# Patient Record
Sex: MALE | Race: WHITE | NOT HISPANIC OR LATINO | Employment: FULL TIME | URBAN - NONMETROPOLITAN AREA
[De-identification: names, ages, dates, MRNs, and addresses within clinical notes are randomized per-mention and may not be internally consistent; named-entity substitution may affect disease eponyms.]

---

## 2017-01-10 ENCOUNTER — COMMUNICATION - GICH (OUTPATIENT)
Dept: FAMILY MEDICINE | Facility: OTHER | Age: 22
End: 2017-01-10

## 2017-01-10 DIAGNOSIS — J45.20 MILD INTERMITTENT ASTHMA, UNCOMPLICATED: ICD-10-CM

## 2017-01-26 ENCOUNTER — COMMUNICATION - GICH (OUTPATIENT)
Dept: FAMILY MEDICINE | Facility: OTHER | Age: 22
End: 2017-01-26

## 2017-01-26 DIAGNOSIS — J45.20 MILD INTERMITTENT ASTHMA, UNCOMPLICATED: ICD-10-CM

## 2017-04-14 ENCOUNTER — OFFICE VISIT - GICH (OUTPATIENT)
Dept: FAMILY MEDICINE | Facility: OTHER | Age: 22
End: 2017-04-14

## 2017-04-14 ENCOUNTER — HISTORY (OUTPATIENT)
Dept: FAMILY MEDICINE | Facility: OTHER | Age: 22
End: 2017-04-14

## 2017-04-14 DIAGNOSIS — J45.909 UNCOMPLICATED ASTHMA: ICD-10-CM

## 2017-04-14 DIAGNOSIS — B97.89 OTHER VIRAL AGENTS AS THE CAUSE OF DISEASES CLASSIFIED ELSEWHERE: ICD-10-CM

## 2017-04-14 DIAGNOSIS — J06.9 ACUTE UPPER RESPIRATORY INFECTION: ICD-10-CM

## 2017-05-24 ENCOUNTER — HISTORY (OUTPATIENT)
Dept: FAMILY MEDICINE | Facility: OTHER | Age: 22
End: 2017-05-24

## 2017-05-24 ENCOUNTER — OFFICE VISIT - GICH (OUTPATIENT)
Dept: FAMILY MEDICINE | Facility: OTHER | Age: 22
End: 2017-05-24

## 2017-05-24 DIAGNOSIS — R62.7 ADULT FAILURE TO THRIVE: ICD-10-CM

## 2017-05-24 DIAGNOSIS — J01.00 ACUTE MAXILLARY SINUSITIS: ICD-10-CM

## 2017-05-24 DIAGNOSIS — J34.89 OTHER SPECIFIED DISORDERS OF NOSE AND NASAL SINUSES: ICD-10-CM

## 2017-08-19 ENCOUNTER — COMMUNICATION - GICH (OUTPATIENT)
Dept: FAMILY MEDICINE | Facility: OTHER | Age: 22
End: 2017-08-19

## 2017-08-19 DIAGNOSIS — J45.20 MILD INTERMITTENT ASTHMA, UNCOMPLICATED: ICD-10-CM

## 2017-12-24 ENCOUNTER — HISTORY (OUTPATIENT)
Dept: EMERGENCY MEDICINE | Facility: OTHER | Age: 22
End: 2017-12-24

## 2017-12-27 ENCOUNTER — COMMUNICATION - GICH (OUTPATIENT)
Dept: FAMILY MEDICINE | Facility: OTHER | Age: 22
End: 2017-12-27

## 2017-12-27 DIAGNOSIS — J45.20 MILD INTERMITTENT ASTHMA, UNCOMPLICATED: ICD-10-CM

## 2017-12-28 NOTE — TELEPHONE ENCOUNTER
Patient Information     Patient Name MRN Sex Felix Boyle [id number] Male 1995      Telephone Encounter by Juan Zapata RN at 2017 12:50 PM     Author:  Juan Zapata RN Service:  (none) Author Type:  NURS- Registered Nurse     Filed:  2017 12:59 PM Encounter Date:  2017 Status:  Signed     :  Juan Zapata RN (NURS- Registered Nurse)            Oral Steroid Inhalers    Office visit in the past 12 months.    Last visit with [facility] was on: 2016 in [facility]  Next visit with KAITY CAR is on: No future appointment listed with this provider  Next visit with Family Practice is on: No future appointment listed in this department    Max refills 12 months from last office visit.    Chart review shows that patient remains overdue for an annual physical with PCP. Last refill of pulmicort on 1/10/17, as well as office visit on 17 with Dr. Bauer indicates this as well. No appointments noted since 1/10/17 and 17. No appointment scheduled at this time. Call placed to patient to discuss. Patient willing to be seen by PCP for a physical/annaul medication management appointment. Writer advised patient that he could fill rx as requested through appointment date once office visit with scheduled. Patient agrees with plan of care. Was transferred to scheduling staff for an appointment. Appointment noted for 17. Writer will fill rx as requested through patient's appointment date as noted.    Prescription refilled per RN Medication Refill Policy.................... Juan Zapata RN ....................  2017   12:58 PM

## 2018-01-02 NOTE — TELEPHONE ENCOUNTER
Patient Information     Patient Name MRN Sex Felix Boyle [id number] Male 1995      Telephone Encounter by Gabriela Ghosh RN at 1/10/2017  2:11 PM     Author:  Gabriela Ghosh RN Service:  (none) Author Type:  NURS- Registered Nurse     Filed:  1/10/2017  2:13 PM Encounter Date:  1/10/2017 Status:  Signed     :  Gabriela Ghosh RN (NURS- Registered Nurse)            Oral Steroid Inhalers    Office visit in the past 12 months.    Last visit with [facility] was on: 2016 [facility]  Next visit with [facility] CAR is on: No future appointment listed with this provider  Next visit with Family Practice is on: No future appointment listed in this department    Max refills 12 months from last office visit.    Due for medication management appointment. Refill given and my chart message sent. Medication has not been addressed in over a year Prescription refilled per RN Medication Refill Policy.................... GABRIELA GHOSH RN ....................  1/10/2017   2:12 PM

## 2018-01-03 NOTE — TELEPHONE ENCOUNTER
Patient Information     Patient Name MRN Sex Felix Boyle [id number] Male 1995      Telephone Encounter by Juan Zapata RN at 2017  8:07 AM     Author:  Juan Zapata RN Service:  (none) Author Type:  NURS- Registered Nurse     Filed:  2017  8:16 AM Encounter Date:  2017 Status:  Signed     :  Juan Zapata RN (NURS- Registered Nurse)            This is a Refill request from: [facility]  Name of Medication: Ventolin  Quantity requested: 3 inhalers with 3 refills  Last fill date: 2016  Due for refill: Unknown, see below  Last visit with [facility] CAR was on: 2016 in [facility]  PCP:  Kaity Car MD  Controlled Substance Agreement:  N/A   Diagnosis r/t this medication request: Mild intermittent asthma    Per chart review, ventolin was discontinued in patient's chart on 16 as patient stated he was no longer taking medication. Medication is not currently on patient's medication list. However, last office with Dr. Nova on 2016 states that patient can utilize his rescue inhaler if needed if he feels wheezy. Will pend refill request to PCP for his consideration and approval.     Unable to complete prescription refill per RN Medication Refill Policy.................... Juan Zapata RN ....................  2017   8:10 AM

## 2018-01-04 NOTE — NURSING NOTE
Patient Information     Patient Name MRN Sex Felix Boyle [id number] Male 1995      Nursing Note by Sofía Momin at 2017  4:15 PM     Author:  Sofía Momin Service:  (none) Author Type:  (none)     Filed:  2017  5:10 PM Encounter Date:  2017 Status:  Signed     :  Sofía Momin            Patient presents to the clinic for nasal congestion, sinus pressure, headaches, productive cough with clear and sometimes green phlegm. Patient states it has been going on for about a week. He has tried Claritin and has an inhaler and nebulizer for asthma which seems to help loosen everything up a little.   Sofía MOON, CMA 2017

## 2018-01-04 NOTE — PROGRESS NOTES
Patient Information     Patient Name MRN Sex     Felix Gutierrez [id number] Male 1995      Progress Notes by Brianne Clarke NP at 2017  4:15 PM     Author:  Brianne Clarke NP Service:  (none) Author Type:  PHYS- Nurse Practitioner     Filed:  2017  5:16 PM Encounter Date:  2017 Status:  Signed     :  Brianne Clarke NP (PHYS- Nurse Practitioner)            Nursing Notes:   Sofía Momin  2017  5:10 PM  Signed  Patient presents to the clinic for nasal congestion, sinus pressure, headaches, productive cough with clear and sometimes green phlegm. Patient states it has been going on for about a week. He has tried Claritin and has an inhaler and nebulizer for asthma which seems to help loosen everything up a little.   Sofía MOON CMA 2017    SUBJECTIVE:    Felix Gutierrez is a 22 y.o. male who presents for Congestion and cough    URI    This is a new problem. The current episode started in the past 7 days. The problem has been unchanged. There has been no fever. Associated symptoms include congestion, coughing, a plugged ear sensation, rhinorrhea and wheezing. Pertinent negatives include no chest pain, ear pain, sinus pain, sneezing, sore throat, swollen glands or vomiting. Associated symptoms comments: Has a HX of asthma. Is not great about using every day med. . He has tried antihistamine for the symptoms. The treatment provided mild relief.       Current Outpatient Prescriptions on File Prior to Visit       Medication  Sig Dispense Refill     albuterol (PROVENTIL) 0.083 % neb solution Inhale 3 mL via a nebulizer every 4 hours if needed for Shortness Of Breath. 1 box 12     albuterol-ipratropium (DUONEB) (2.5-0.5 mg) in 3 mL NEBULIZATION solution Inhale 3 mL via a nebulizer every 6 hours if needed. 1 box 0     Nebulizer Nebulizer, neb kit, neb cup and mask.   For home use. Length of need  for Medicare patients: lifelong 1 Device 0     PULMICORT FLEXHALER 90  "mcg/actuation inhaler INHALE ONE PUFF TWICE DAILY 1 Inhaler 0     VENTOLIN HFA 90 mcg/actuation inhaler INHALE TWO PUFFS INTO LUNGS EVERY 4 HOURS AS NEEDED FOR SHORTNESS OF BREATH OR  WHEEZING 3 Inhaler 3     No current facility-administered medications on file prior to visit.        REVIEW OF SYSTEMS:  Review of Systems   HENT: Positive for congestion and rhinorrhea. Negative for ear pain, sneezing and sore throat.    Respiratory: Positive for cough and wheezing.    Cardiovascular: Negative for chest pain.   Gastrointestinal: Negative for vomiting.       OBJECTIVE:  /80  Pulse 84  Temp 98.1  F (36.7  C) (Tympanic)  Ht 1.734 m (5' 8.25\")  Wt 56.7 kg (125 lb)  BMI 18.87 kg/m2    EXAM:   Physical Exam   Constitutional: He is well-developed, well-nourished, and in no distress.   HENT:   Head: Normocephalic and atraumatic.   Right Ear: Tympanic membrane and ear canal normal.   Left Ear: Tympanic membrane and ear canal normal.   Nose: Rhinorrhea present. Right sinus exhibits no maxillary sinus tenderness and no frontal sinus tenderness. Left sinus exhibits no maxillary sinus tenderness and no frontal sinus tenderness.   Mouth/Throat: Uvula is midline, oropharynx is clear and moist and mucous membranes are normal.   Eyes: Conjunctivae are normal.   Neck: Neck supple.   Cardiovascular: Normal rate, regular rhythm and normal heart sounds.    Pulmonary/Chest: Effort normal. No respiratory distress. He has no decreased breath sounds. He has wheezes in the right upper field and the left upper field. He has no rhonchi. He has no rales.   MIld upper airway wheezing.    Lymphadenopathy:     He has no cervical adenopathy.   Nursing note and vitals reviewed.      ASSESSMENT/PLAN:    ICD-10-CM    1. Viral URI with cough J06.9 predniSONE (DELTASONE) 20 mg tablet     B97.89    2. Uncomplicated asthma, unspecified asthma severity J45.909 predniSONE (DELTASONE) 20 mg tablet        Plan:  Prednisone discussed. Viral illness " discussed. Continue inhalers as prescribed. I explained my diagnostic considerations and recommendations to the patient, who voiced understanding and agreement with the treatment plan. All questions were answered. We discussed potential side effects of any prescribed or recommended therapies, as well as expectations for response to treatments. He was advised to contact our office if there is no improvement or worsening of conditions or symptoms.  If s/s worsen or persist, patient will either come back or follow up with PCP.       FREDDY LE NP ....................  4/14/2017   5:16 PM

## 2018-01-04 NOTE — PATIENT INSTRUCTIONS
Patient Information     Patient Name MRN Felix Lenz [id number] Male 1995      Patient Instructions by Brianne Clarke NP at 2017  4:15 PM     Author:  Brianne Clarke NP Service:  (none) Author Type:  PHYS- Nurse Practitioner     Filed:  2017  5:11 PM Encounter Date:  2017 Status:  Signed     :  Brianne Clarke NP (PHYS- Nurse Practitioner)            Cold Medicines   What are cold medicines?  Symptoms of the common cold start gradually over several days and usually last about two weeks. Symptoms may include sneezing, a stuffy or runny nose, sore throat, cough, watery eyes, mild headache, or body aches. A cold will go away on its own without treatment. However, there are many nonprescription products that may help relieve some of the symptoms of a cold. Cold medicines often contain more than one ingredient and are used to treat more than one symptom. Read the labels and buy products that have only the ingredients that you need. If you are not sure which medicine is best, ask your pharmacist.  How do they work?  Decongestants reduce swelling in your nose and sinuses. They may also lessen the amount of mucus made by your nose. If you use decongestants more often than directed, your stuffy nose may get worse.   Antihistamines block the effect of histamine. Histamine is a chemical your body makes when you have an allergic reaction. Antihistamines are most often used to treat itchy or watery eyes or a stuffy or runny nose caused by an allergy. Antihistamines may not help a stuffy or runny nose caused by a cold because they can make mucus thick and dry.  Mucolytics are medicines that make mucus thinner so that it is easier to cough up out of your throat and lungs.  Expectorants are cough medicines that may help to keep the mucus thin and bring up mucus from the lungs when you cough. This may relieve chest congestion and make it easier to breathe.   Cough suppressants  (antitussives) are medicines that lessen the urge to cough. They may give relief from a dry, hacking cough. If you have a cough that is wet sounding and produces mucus, it is important for you to cough the mucus up out of your lungs. For this reason, cough suppressants are not recommended for a wet sounding cough.  Fever and pain relievers, such as acetaminophen, aspirin, or other nonsteroidal anti-inflammatory drugs (NSAIDs), are often included in cold medicine. Read labels carefully to avoid taking more medicine than you need.  What else do I need to know about this medicine?    Talk to your healthcare provider if your symptoms start suddenly or you have severe symptoms. This may mean you have something more serious than a cold.    Follow the directions that come with your medicine, including information about food or alcohol. Make sure you know how and when to take your medicine. Do not take more or less than you are supposed to take.    Try to get all of your medicine at the same place. Your pharmacist can help make sure that all of your medicines are safe to take together.    Keep a list of your medicines with you. List all of the prescription medicines, nonprescription medicines, supplements, natural remedies, and vitamins that you take. Tell all healthcare providers who treat you about all of the products you are taking.    Many medicines have side effects. A side effect is a symptom or problem that is caused by the medicine. Ask your healthcare provider or pharmacist what side effects the medicine may cause and what you should do if you have side effects.    Nonsteroidal anti-inflammatory medicines (NSAIDs), such as ibuprofen, naproxen, and aspirin, may cause stomach bleeding and other problems. These risks increase with age. Read the label and take as directed. Unless recommended by your healthcare provider, do not take for more than 10 days for any reason.    Acetaminophen may cause liver damage or other  problems. Unless recommended by your provider, don't take more than 3000 milligrams (mg) in 24 hours. To make sure you don t take too much, check other medicines you take to see if they also contain acetaminophen. Ask your provider if you need to avoid drinking alcohol while taking this medicine.  If you have any questions, ask your healthcare provider or pharmacist for more information. Be sure to keep all appointments for provider visits or tests.

## 2018-01-05 NOTE — PATIENT INSTRUCTIONS
Patient Information     Patient Name MRN Sex Felix Boyle [id number] Male 1995      Patient Instructions by Rita Bauer MD at 2017 12:07 PM     Author:  Rita Bauer MD  Service:  (none) Author Type:  Physician     Filed:  2017 12:08 PM  Encounter Date:  2017 Status:  Addendum     :  Rita Bauer MD (Physician)        Related Notes: Original Note by Rita Bauer MD (Physician) filed at 2017 12:07 PM            Sinusitis (sinus infection)  Stay hydrated remember bladder irritants     Bladder irritants include callum, limes, caffeine, carbonated beverages and alcohol.       Follow up with Dr. Crawford;  Labs prior     Saline nasal spray  bactroban for nasal lesions   What you should do:    If you were prescribed antibiotics, take them until they are gone. Do not skip a dose.    Wash your hands often with soap and water    Get plenty of rest and fluids    Apply warm compresses to your face    Use sinus rinses, a humidifier or a vaporizer to add more moisture to your sinus tissues.    Think about using a Neti pot    Take acetaminophen (Tylenol ) or ibuprofen (Advil ) for pain    How will you know this plan is not working - warning signs you should watch for:    Pain or swelling around your eyes    Swollen, painful forehead and/or facial (sinus) pain    When should you be seen again?    If you develop severe headache, double vision, stiff neck or confusion, return right away.    If you have any of the warning signs listed above, return in 1 to 2 days.    If your symptoms do not get better in 7 to 10 days, return at that time.    Who should you see if the plan is not working?    Make an appointment to see me or return to your clinic    For more information about sinusitis, go to:  [email or web address]      Index   Caffeine in the Diet    ________________________________________________________________________  KEY POINTS    Caffeine affects many parts of the body. It can help you feel more alert and less tired.    Too much caffeine can cause jumpiness, trouble sleeping, and stomach problems. It can also affect your heartbeat.    The best way to see if you are getting too much caffeine is to notice how it affects you.  ________________________________________________________________________  What is caffeine?   Caffeine is a stimulant, which means it speeds up some of your body processes. It is found naturally in tea leaves, coffee beans, and cocoa beans. Caffeine can also be manmade.   Most caffeine in the diet comes from drinks such as coffee, tea, soda, or energy drinks. Caffeine can also be found in mints, gum, chocolates, energy bars, and other foods. Some vitamins, pain medicines, and diet pills also contain caffeine.  The FDA has warned people to avoid powdered pure caffeine. A teaspoon of this product is the same as 25 cups of coffee. Taking this amount of caffeine all at once can cause death.   How does caffeine affect the body?   Caffeine affects many parts of the body:    It stimulates the brain and makes you feel wide awake, energetic, and better able to concentrate.    It makes your heart beat faster.    It helps you go longer without feeling tired.    It makes you less sensitive to pain.    It helps muscles to work better.    It increases the amount of acid made by the stomach.  Too much caffeine can cause:    Restlessness, jumpiness, anxiety, and trouble sleeping    A fast or irregular heartbeat    Twitching muscles    Nausea    Stomach pain or heartburn  In the past it has been thought that caffeine can cause your body to lose too much fluid (dehydration). Coffee and tea work the same way that other fluids do. If you rarely drink caffeinated drinks and suddenly drink a lot of them, you may have to urinate more than if you'd had just  water. But the body quickly gets used to caffeine. If you regularly drink caffeine, you won't urinate any more after drinking caffeine than when you drink the same amount of water.   You can develop caffeine tolerance. This means you need more and more caffeine to get the desired effect. If you become dependent on caffeine, you have short-term withdrawal symptoms when you go without it. You may feel tired, have a headache, or be irritable.  Should I limit or avoid caffeine?  Caffeine may cause problems if you have:    Type 2 diabetes (More than 250 milligrams of caffeine can increase blood glucose levels.)    Anxiety or panic attacks    An irregular or pounding heartbeat    Heart disease, or if you had a heart attack in the past few weeks    Uncontrolled high blood pressure    Trouble sleeping    A peptic ulcer    Acid reflux    Premenstrual syndrome (PMS)  How much caffeine is too much?   A reasonable amount of caffeine is 200 to 300 mg per day. If you are pregnant, some healthcare providers recommend no caffeine during the first 3 months and no more than 200 mg a day during the rest of your pregnancy.    The amount of caffeine in a cup of coffee depends on the amount of coffee grounds used to make it and whether it was instant or brewed. An 8-ounce cup of regular coffee may contain 75 to 120 mg of caffeine.    Espresso coffee contains an average of 30 to 50 mg per 1-ounce cup.    An 8-ounce cup of tea may contain 20 to 100 mg of caffeine.    Soft drinks with caffeine have 30 to 70 mg caffeine in a 12-ounce serving.    Energy drinks range from 50 to 300 mg of caffeine in an 8-ounce serving. There may be 2 or 3 servings per can. They may also be high in calories.    Candy or products that say  energy  on the label may contain a lot of caffeine.  The best way to see if you are getting too much caffeine is to notice how it affects you. If you are having symptoms, then you are getting too much. If you have unpleasant  symptoms, cut back or switch to products that are caffeine free.  Developed by [employer].  Adult Advisor 2016.3 published by [employer].  Last modified: 2016-02-16  Last reviewed: 2015-03-02  This content is reviewed periodically and is subject to change as new health information becomes available. The information is intended to inform and educate and is not a replacement for medical evaluation, advice, diagnosis or treatment by a healthcare professional.  References   Adult Advisor 2016.3 Index    Copyright   2016 [employer], a division of [employer] All rights reserved.

## 2018-01-05 NOTE — NURSING NOTE
Patient Information     Patient Name MRN Sex Felix Boyle [id number] Male 1995      Nursing Note by Bernadette Amaral at 2017 11:45 AM     Author:  Bernadette Amaral Service:  (none) Author Type:  (none)     Filed:  2017 11:49 AM Encounter Date:  2017 Status:  Signed     :  Bernadette Amaral            Sinus Congestion X few weeks and has recently started to get some sores inside the nasal area that he is concerned about.   R) ear has been sore but is feeling better.   Bernadette Amaral LPN........................2017  11:45 AM

## 2018-01-05 NOTE — PROGRESS NOTES
Patient Information     Patient Name MRN Sex Felix Boyle [id number] Male 1995      Progress Notes by Rita Bauer MD at 2017 11:45 AM     Author:  Rita Bauer MD Service:  (none) Author Type:  Physician     Filed:  2017 12:27 PM Encounter Date:  2017 Status:  Signed     :  Rita Bauer MD (Physician)            Nursing Notes:   Bernadette Amaral  2017 11:49 AM  Signed  Sinus Congestion X few weeks and has recently started to get some sores inside the nasal area that he is concerned about.   R) ear has been sore but is feeling better.   Bernadette Amaral LPN........................2017  11:45 AM         Subjective:  Felix Gutierrez is a 22 y.o. male who presents for UPPER RESPIRATORY INFECTION      patient has a history of asthma and seasonal allergies. He started taking Claritin which has helped but he continues to have congestion in his face and cough. He notes it is been having sores in his nose last week. He is not using Flonase. Denies any illicit drug use. He denies picking at his nose. No significant epistaxis. He has had low-grade fevers. He has felt tired.    Inability to gain significant weight  patient notes that he has had inability to gain weight. He is noted to be tachycardiac today. He did have a cup of coffee. He has previously undergone workup for this in 2015 with echocardiogram which was normal. He is wondering if he has thyroid problem. He is sexually active. He is monogamous. No history of tattoos    Wt Readings from Last 3 Encounters:    17 55.8 kg (123 lb)   17 56.7 kg (125 lb)   16 54.4 kg (120 lb)           Allergies     Allergen  Reactions     Penicillins Rash     Current Outpatient Prescriptions on File Prior to Visit       Medication  Sig Dispense Refill     albuterol (PROVENTIL) 0.083 % neb solution Inhale 3 mL via a nebulizer every 4 hours if needed for Shortness Of Breath. 1 box 12      albuterol-ipratropium (DUONEB) (2.5-0.5 mg) in 3 mL NEBULIZATION solution Inhale 3 mL via a nebulizer every 6 hours if needed. 1 box 0     Nebulizer Nebulizer, neb kit, neb cup and mask.   For home use. Length of need  for Medicare patients: lifelong 1 Device 0     PULMICORT FLEXHALER 90 mcg/actuation inhaler INHALE ONE PUFF TWICE DAILY 1 Inhaler 0     VENTOLIN HFA 90 mcg/actuation inhaler INHALE TWO PUFFS INTO LUNGS EVERY 4 HOURS AS NEEDED FOR SHORTNESS OF BREATH OR  WHEEZING 3 Inhaler 3     No current facility-administered medications on file prior to visit.        Problem List/PMH: reviewed in EMR    Social Hx:  Social History        Substance Use Topics          Smoking status:   Former Smoker      Packs/day:  1.00      Types:  Cigarettes      Quit date:  1/31/2015      Smokeless tobacco:   Never Used      Alcohol use   0.0 oz/week     0 Standard drinks or equivalent per week        Comment: occasional       Social History Narrative    Parents are .  Dad [place], drives [employer].  Mom has a job here with [employer].  Patient is interested in music and [employer]. Works as  of the [employer] (2016)    Bel[place] Sister, age 13    Masha Gutierrez Mother                    Family Hx:   Family History       Problem   Relation Age of Onset     GI Disease  Mother      Crohns disease         Objective:  /60  Pulse 100  Temp 97.9  F (36.6  C) (Tympanic)   Wt 55.8 kg (123 lb)  BMI 18.57 kg/m2   patient is tired in appearance PERRLA EOMI small amount of conjunctivitis. TMs are retracted bilaterally small amount of fluid   Oropharynx with significant postnasal drip. Neck supple without adenopathy lungs clear heart sounds regular sinuses are tender to touch maxillary. No frontal. He has cystic acne on his T-zone. In his nares he has a small lesion that is not ulcerated in his left knee or at the 4 to 5 o'clock position and is a small 1-2 millimeter area of irritation in his  right knee or at the 11 o'clock position none of which are actively bleeding  Assessment:    ICD-10-CM    1. Nasal sore J34.89 mupirocin 2% topical (BACTROBAN OINTMENT) ointment      trimethoprim-sulfamethoxazole,  mg, (BACTRIM; SEPTRA) per tablet   2. Subacute maxillary sinusitis J01.00    3. Failure to gain weight in adult R62.7 COMPLETE METABOLIC PANEL      TSH      T4 (THYROXINE)      CBC WITH DIFFERENTIAL      HIV 1 & 2      HBSAG (HBS)      ANTI HCV      TREPONEMA PALLIDUM      GC CHLAMYDIA TRACH PROBE        He was reminded he has acute care appointment today but he was provided follow-up appointment with his primary care physician's labs ordered a head including STD screening    Plan:   -- Expected clinical course discussed   -- Medications and their side effects discussed  Patient Instructions   Sinusitis (sinus infection)  Stay hydrated remember bladder irritants     Bladder irritants include callum, limes, caffeine, carbonated beverages and alcohol.       Follow up with Dr. Crawford;  Labs prior     Saline nasal spray  bactroban for nasal lesions   What you should do:    If you were prescribed antibiotics, take them until they are gone. Do not skip a dose.    Wash your hands often with soap and water    Get plenty of rest and fluids    Apply warm compresses to your face    Use sinus rinses, a humidifier or a vaporizer to add more moisture to your sinus tissues.    Think about using a Neti pot    Take acetaminophen (Tylenol ) or ibuprofen (Advil ) for pain    How will you know this plan is not working - warning signs you should watch for:    Pain or swelling around your eyes    Swollen, painful forehead and/or facial (sinus) pain    When should you be seen again?    If you develop severe headache, double vision, stiff neck or confusion, return right away.    If you have any of the warning signs listed above, return in 1 to 2 days.    If your symptoms do not get better in 7 to 10 days, return at that  time.    Who should you see if the plan is not working?    Make an appointment to see me or return to your clinic    For more information about sinusitis, go to:  [email or web address]      Index   Caffeine in the Diet   ________________________________________________________________________  KEY POINTS    Caffeine affects many parts of the body. It can help you feel more alert and less tired.    Too much caffeine can cause jumpiness, trouble sleeping, and stomach problems. It can also affect your heartbeat.    The best way to see if you are getting too much caffeine is to notice how it affects you.  ________________________________________________________________________  What is caffeine?   Caffeine is a stimulant, which means it speeds up some of your body processes. It is found naturally in tea leaves, coffee beans, and cocoa beans. Caffeine can also be manmade.   Most caffeine in the diet comes from drinks such as coffee, tea, soda, or energy drinks. Caffeine can also be found in mints, gum, chocolates, energy bars, and other foods. Some vitamins, pain medicines, and diet pills also contain caffeine.  The FDA has warned people to avoid powdered pure caffeine. A teaspoon of this product is the same as 25 cups of coffee. Taking this amount of caffeine all at once can cause death.   How does caffeine affect the body?   Caffeine affects many parts of the body:    It stimulates the brain and makes you feel wide awake, energetic, and better able to concentrate.    It makes your heart beat faster.    It helps you go longer without feeling tired.    It makes you less sensitive to pain.    It helps muscles to work better.    It increases the amount of acid made by the stomach.  Too much caffeine can cause:    Restlessness, jumpiness, anxiety, and trouble sleeping    A fast or irregular heartbeat    Twitching muscles    Nausea    Stomach pain or heartburn  In the past it has been thought that  caffeine can cause your body to lose too much fluid (dehydration). Coffee and tea work the same way that other fluids do. If you rarely drink caffeinated drinks and suddenly drink a lot of them, you may have to urinate more than if you'd had just water. But the body quickly gets used to caffeine. If you regularly drink caffeine, you won't urinate any more after drinking caffeine than when you drink the same amount of water.   You can develop caffeine tolerance. This means you need more and more caffeine to get the desired effect. If you become dependent on caffeine, you have short-term withdrawal symptoms when you go without it. You may feel tired, have a headache, or be irritable.  Should I limit or avoid caffeine?  Caffeine may cause problems if you have:    Type 2 diabetes (More than 250 milligrams of caffeine can increase blood glucose levels.)    Anxiety or panic attacks    An irregular or pounding heartbeat    Heart disease, or if you had a heart attack in the past few weeks    Uncontrolled high blood pressure    Trouble sleeping    A peptic ulcer    Acid reflux    Premenstrual syndrome (PMS)  How much caffeine is too much?   A reasonable amount of caffeine is 200 to 300 mg per day. If you are pregnant, some healthcare providers recommend no caffeine during the first 3 months and no more than 200 mg a day during the rest of your pregnancy.    The amount of caffeine in a cup of coffee depends on the amount of coffee grounds used to make it and whether it was instant or brewed. An 8-ounce cup of regular coffee may contain 75 to 120 mg of caffeine.    Espresso coffee contains an average of 30 to 50 mg per 1-ounce cup.    An 8-ounce cup of tea may contain 20 to 100 mg of caffeine.    Soft drinks with caffeine have 30 to 70 mg caffeine in a 12-ounce serving.    Energy drinks range from 50 to 300 mg of caffeine in an 8-ounce serving. There may be 2 or 3 servings per can. They may also be high in calories.    Candy  or products that say  energy  on the label may contain a lot of caffeine.  The best way to see if you are getting too much caffeine is to notice how it affects you. If you are having symptoms, then you are getting too much. If you have unpleasant symptoms, cut back or switch to products that are caffeine free.  Developed by [employer].  Adult Advisor 2016.3 published by [employer].  Last modified: 2016-02-16  Last reviewed: 2015-03-02  This content is reviewed periodically and is subject to change as new health information becomes available. The information is intended to inform and educate and is not a replacement for medical evaluation, advice, diagnosis or treatment by a healthcare professional.  References   Adult Advisor 2016.3 Index    Copyright   2016 [employer], a division of [employer] All rights reserved.           Electronically signed by Rita Bauer MD

## 2018-01-11 ENCOUNTER — COMMUNICATION - GICH (OUTPATIENT)
Dept: FAMILY MEDICINE | Facility: OTHER | Age: 23
End: 2018-01-11

## 2018-01-26 VITALS
TEMPERATURE: 98.1 F | HEART RATE: 84 BPM | DIASTOLIC BLOOD PRESSURE: 80 MMHG | HEIGHT: 68 IN | BODY MASS INDEX: 18.94 KG/M2 | SYSTOLIC BLOOD PRESSURE: 120 MMHG | WEIGHT: 125 LBS

## 2018-01-26 VITALS
TEMPERATURE: 97.9 F | SYSTOLIC BLOOD PRESSURE: 110 MMHG | DIASTOLIC BLOOD PRESSURE: 60 MMHG | BODY MASS INDEX: 18.7 KG/M2 | WEIGHT: 123 LBS | HEART RATE: 100 BPM

## 2018-01-29 ENCOUNTER — DOCUMENTATION ONLY (OUTPATIENT)
Dept: FAMILY MEDICINE | Facility: OTHER | Age: 23
End: 2018-01-29

## 2018-01-29 PROBLEM — L70.9 ACNE, MILD: Status: ACTIVE | Noted: 2018-01-29

## 2018-01-29 RX ORDER — PREDNISONE 10 MG/1
TABLET ORAL
COMMUNITY
Start: 2017-12-24 | End: 2018-02-23

## 2018-01-29 RX ORDER — ALBUTEROL SULFATE 0.83 MG/ML
2.5 SOLUTION RESPIRATORY (INHALATION) EVERY 4 HOURS PRN
COMMUNITY
Start: 2014-10-14 | End: 2018-09-17

## 2018-01-29 RX ORDER — ALBUTEROL SULFATE 90 UG/1
2 AEROSOL, METERED RESPIRATORY (INHALATION) EVERY 4 HOURS PRN
COMMUNITY
Start: 2017-01-27 | End: 2018-06-27

## 2018-01-29 RX ORDER — IPRATROPIUM BROMIDE AND ALBUTEROL SULFATE 2.5; .5 MG/3ML; MG/3ML
3 SOLUTION RESPIRATORY (INHALATION) 4 TIMES DAILY PRN
COMMUNITY
Start: 2017-12-24 | End: 2022-04-21

## 2018-01-29 RX ORDER — LORATADINE 10 MG/1
10 TABLET ORAL DAILY
COMMUNITY
Start: 2017-05-24 | End: 2022-04-21

## 2018-02-13 NOTE — TELEPHONE ENCOUNTER
Patient Information     Patient Name MRN Sex Felix Boyle [id number] Male 1995      Telephone Encounter by Juan Zapata RN at 2017  4:12 PM     Author:  Juan Zapata RN Service:  (none) Author Type:  NURS- Registered Nurse     Filed:  2017  4:19 PM Encounter Date:  2017 Status:  Signed     :  Juan Zapata RN (NURS- Registered Nurse)            Oral Steroid Inhalers    Office visit in the past 12 months.    Last visit with [facility] was on: 2016 in [address] [facility]  Next visit with [facility] is on: No future appointment listed with this provider  Next visit with Family Practice is on: No future appointment listed in this department    Max refills 12 months from last office visit.    Chart review shows that patient is overdue and remains overdue for an office visit with PCP. See 17 refill encounter. Writer is unable to fill rx as requested. Will route rx request to PCP for his consideration/approval at this time, as well as will send patient a reminder letter/MyChart message.    Unable to complete prescription refill per RN Medication Refill Policy.................... Juan Zapata RN ....................  2017   4:17 PM

## 2018-02-13 NOTE — TELEPHONE ENCOUNTER
Patient Information     Patient Name MRN Felix Lenz [id number] Male 1995      Telephone Encounter by Nila Zafar at 2018  1:27 PM     Author:  Nila Zafar Service:  (none) Author Type:  (none)     Filed:  2018  1:28 PM Encounter Date:  2018 Status:  Signed     :  Nila Zafar            Left message to call back. Needing to go over ATAQ questionnaire.  Nila Zafar LPN ....................  2018   1:28 PM

## 2018-02-23 ENCOUNTER — OFFICE VISIT (OUTPATIENT)
Dept: FAMILY MEDICINE | Facility: OTHER | Age: 23
End: 2018-02-23
Attending: NURSE PRACTITIONER
Payer: COMMERCIAL

## 2018-02-23 VITALS
HEART RATE: 78 BPM | HEIGHT: 68 IN | SYSTOLIC BLOOD PRESSURE: 138 MMHG | WEIGHT: 130 LBS | DIASTOLIC BLOOD PRESSURE: 82 MMHG | TEMPERATURE: 98.1 F | BODY MASS INDEX: 19.7 KG/M2

## 2018-02-23 DIAGNOSIS — J01.00 ACUTE MAXILLARY SINUSITIS, RECURRENCE NOT SPECIFIED: Primary | ICD-10-CM

## 2018-02-23 PROCEDURE — 99213 OFFICE O/P EST LOW 20 MIN: CPT | Performed by: NURSE PRACTITIONER

## 2018-02-23 PROCEDURE — G0463 HOSPITAL OUTPT CLINIC VISIT: HCPCS

## 2018-02-23 RX ORDER — CEFDINIR 300 MG/1
300 CAPSULE ORAL 2 TIMES DAILY
Qty: 14 CAPSULE | Refills: 0 | Status: SHIPPED | OUTPATIENT
Start: 2018-02-23 | End: 2018-03-02

## 2018-02-23 ASSESSMENT — ENCOUNTER SYMPTOMS
FEVER: 0
HEADACHES: 1
COUGH: 0

## 2018-02-23 ASSESSMENT — PAIN SCALES - GENERAL: PAINLEVEL: MILD PAIN (2)

## 2018-02-23 NOTE — MR AVS SNAPSHOT
After Visit Summary   2/23/2018    Felix Gutierrez    MRN: [id number]           Patient Information     Date Of Birth          1995        Visit Information        Provider Department      2/23/2018 3:00 PM Flora Estrada APRN CNP [facility]        Today's Diagnoses     Acute maxillary sinusitis, recurrence not specified    -  1      Care Instructions      Sinusitis (Antibiotic Treatment)    The sinuses are air-filled spaces within the bones of the face. They connect to the inside of the nose. Sinusitis is an inflammation of the tissue lining the sinus cavity. Sinus inflammation can occur during a cold. It can also be due to allergies to pollens and other particles in the air. Sinusitis can cause symptoms of sinus congestion and fullness. A sinus infection causes fever, headache and facial pain. There is often green or yellow drainage from the nose or into the back of the throat (post-nasal drip). You have been given antibiotics to treat this condition.  Home care:    Take the full course of antibiotics as instructed. Do not stop taking them, even if you feel better.    Drink plenty of water, hot tea, and other liquids. This may help thin mucus. It also may promote sinus drainage.    Heat may help soothe painful areas of the face. Use a towel soaked in hot water. Or,  the shower and direct the hot spray onto your face. Using a vaporizer along with a menthol rub at night may also help.     An expectorant containing guaifenesin may help thin the mucus and promote drainage from the sinuses.    Over-the-counter decongestants may be used unless a similar medicine was prescribed. Nasal sprays work the fastest. Use one that contains phenylephrine or oxymetazoline. First blow the nose gently. Then use the spray. Do not use these medicines more often than directed on the label or symptoms may get worse. You may also use tablets containing pseudoephedrine. Avoid  products that combine ingredients, because side effects may be increased. Read labels. You can also ask the pharmacist for help. (NOTE: Persons with high blood pressure should not use decongestants. They can raise blood pressure.)    Over-the-counter antihistamines may help if allergies contributed to your sinusitis.      Do not use nasal rinses or irrigation during an acute sinus infection, unless told to by your health care provider. Rinsing may spread the infection to other sinuses.    Use acetaminophen or ibuprofen to control pain, unless another pain medicine was prescribed. (If you have chronic liver or kidney disease or ever had a stomach ulcer, talk with your doctor before using these medicines. Aspirin should never be used in anyone under 18 years of age who is ill with a fever. It may cause severe liver damage.)    Don't smoke. This can worsen symptoms.  Follow-up care  Follow up with your healthcare provider or our staff if you are not improving within the next week.  When to seek medical advice  Call your healthcare provider if any of these occur:    Facial pain or headache becoming more severe    Stiff neck    Unusual drowsiness or confusion    Swelling of the forehead or eyelids    Vision problems, including blurred or double vision    Fever of 100.4 F (38 C) or higher, or as directed by your healthcare provider    Seizure    Breathing problems    Symptoms not resolving within 10 days  Date Last Reviewed: 4/13/2015 2000-2017 [employer]. [address] [place] [address], [place] [address]. All rights reserved. This information is not intended as a substitute for professional medical care. Always follow your healthcare professional's instructions.                Follow-ups after your visit        Who to contact     If you have questions or need follow up information about today's clinic visit or your schedule please contact [facility] \[facility]\"" directly at [phone].  Normal or  "non-critical lab and imaging results will be communicated to you by MyChart, letter or phone within 4 business days after the clinic has received the results. If you do not hear from us within 7 days, please contact the clinic through [employer] or phone. If you have a critical or abnormal lab result, we will notify you by phone as soon as possible.  Submit refill requests through [employer] or call your pharmacy and they will forward the refill request to us. Please allow 3 business days for your refill to be completed.          Additional Information About Your Visit        [employer] Information     [employer] lets you send messages to your doctor, view your test results, renew your prescriptions, schedule appointments and more. To sign up, go to [email or web address] . Click on \"Log in\" on the left side of the screen, which will take you to the Welcome page. Then click on \"Sign up Now\" on the right side of the page.     You will be asked to enter the access code listed below, as well as some personal information. Please follow the directions to create your username and password.     Your access code is: [email or web address]  Expires: 2018  4:03 PM     Your access code will  in 90 days. If you need help or a new code, please call your [facility] clinic or [phone].        Care EveryWhere ID     This is your Care EveryWhere ID. This could be used by other organizations to access your [facility] medical records  [id number]        Your Vitals Were     Pulse Temperature Height BMI (Body Mass Index)          78 98.1  F (36.7  C) (Tympanic) 5' 8\" (1.727 m) 19.77 kg/m2         Blood Pressure from Last 3 Encounters:   18 138/82   17 110/60   17 120/80    Weight from Last 3 Encounters:   18 130 lb (59 kg)   17 123 lb (55.8 kg)   17 125 lb (56.7 kg)              Today, you had the following     No orders found for display         Today's Medication Changes          These changes are accurate " as of 2/23/18  4:03 PM.  If you have any questions, ask your nurse or doctor.               Start taking these medicines.        Dose/Directions    cefdinir 300 MG capsule   Commonly known as:  OMNICEF   Used for:  Acute maxillary sinusitis, recurrence not specified   Started by:  Flora Estrada APRN CNP        Dose:  300 mg   Take 1 capsule (300 mg) by mouth 2 times daily for 7 days   Quantity:  14 capsule   Refills:  0            Where to get your medicines      These medications were sent to [facility] [address] [address]     Phone:  [phone]     cefdinir 300 MG capsule                Primary Care Provider Office Phone # Fax #    Jose D Crawford [phone]       [address] [facility] [address]        Equal Access to Services     [facility]: Hadii hannah villatoroo Solucius, waaxda luqadaha, qaybta kaalmada adejaynayateri, shantanu murguia . So [facility] [phone].    ATENCIÓN: Si habla español, tiene a doherty disposición servicios gratuitos de asistencia lingüística. Llame al [phone].    We comply with applicable federal civil rights laws and [place] laws. We do not discriminate on the basis of race, color, national origin, age, disability, sex, sexual orientation, or gender identity.            Thank you!     Thank you for choosing [facility] \[facility]\""  for your care. Our goal is always to provide you with excellent care. Hearing back from our patients is one way we can continue to improve our services. Please take a few minutes to complete the written survey that you may receive in the mail after your visit with us. Thank you!             Your Updated Medication List - Protect others around you: Learn how to safely use, store and throw away your medicines at www.disposemymeds.org.          This list is accurate as of 2/23/18  4:03 PM.  Always use your  most recent med list.                   Brand Name Dispense Instructions for use Diagnosis    * albuterol (2.5 MG/3ML) 0.083% neb solution      Inhale 2.5 mg into the lungs every 4 hours as needed        * VENTOLIN  (90 BASE) MCG/ACT Inhaler   Generic drug:  albuterol      Inhale 2 puffs into the lungs every 4 hours as needed        BUDESONIDE (INHALATION) 90 MCG/ACT Aepb      Inhale 1 puff into the lungs 2 times daily        cefdinir 300 MG capsule    OMNICEF    14 capsule    Take 1 capsule (300 mg) by mouth 2 times daily for 7 days    Acute maxillary sinusitis, recurrence not specified       ipratropium - albuterol 0.5 mg/2.5 mg/3 mL 0.5-2.5 (3) MG/3ML neb solution    DUONEB     Inhale 3 mLs into the lungs 4 times daily as needed        loratadine 10 MG tablet    CLARITIN     Take 10 mg by mouth daily        * Notice:  This list has 2 medication(s) that are the same as other medications prescribed for you. Read the directions carefully, and ask your doctor or other care provider to review them with you.

## 2018-02-23 NOTE — PATIENT INSTRUCTIONS
Sinusitis (Antibiotic Treatment)    The sinuses are air-filled spaces within the bones of the face. They connect to the inside of the nose. Sinusitis is an inflammation of the tissue lining the sinus cavity. Sinus inflammation can occur during a cold. It can also be due to allergies to pollens and other particles in the air. Sinusitis can cause symptoms of sinus congestion and fullness. A sinus infection causes fever, headache and facial pain. There is often green or yellow drainage from the nose or into the back of the throat (post-nasal drip). You have been given antibiotics to treat this condition.  Home care:    Take the full course of antibiotics as instructed. Do not stop taking them, even if you feel better.    Drink plenty of water, hot tea, and other liquids. This may help thin mucus. It also may promote sinus drainage.    Heat may help soothe painful areas of the face. Use a towel soaked in hot water. Or,  the shower and direct the hot spray onto your face. Using a vaporizer along with a menthol rub at night may also help.     An expectorant containing guaifenesin may help thin the mucus and promote drainage from the sinuses.    Over-the-counter decongestants may be used unless a similar medicine was prescribed. Nasal sprays work the fastest. Use one that contains phenylephrine or oxymetazoline. First blow the nose gently. Then use the spray. Do not use these medicines more often than directed on the label or symptoms may get worse. You may also use tablets containing pseudoephedrine. Avoid products that combine ingredients, because side effects may be increased. Read labels. You can also ask the pharmacist for help. (NOTE: Persons with high blood pressure should not use decongestants. They can raise blood pressure.)    Over-the-counter antihistamines may help if allergies contributed to your sinusitis.      Do not use nasal rinses or irrigation during an acute sinus infection, unless told to by  your health care provider. Rinsing may spread the infection to other sinuses.    Use acetaminophen or ibuprofen to control pain, unless another pain medicine was prescribed. (If you have chronic liver or kidney disease or ever had a stomach ulcer, talk with your doctor before using these medicines. Aspirin should never be used in anyone under 18 years of age who is ill with a fever. It may cause severe liver damage.)    Don't smoke. This can worsen symptoms.  Follow-up care  Follow up with your healthcare provider or our staff if you are not improving within the next week.  When to seek medical advice  Call your healthcare provider if any of these occur:    Facial pain or headache becoming more severe    Stiff neck    Unusual drowsiness or confusion    Swelling of the forehead or eyelids    Vision problems, including blurred or double vision    Fever of 100.4 F (38 C) or higher, or as directed by your healthcare provider    Seizure    Breathing problems    Symptoms not resolving within 10 days  Date Last Reviewed: 4/13/2015 2000-2017 [employer]. [address] [place] [address], [place] [id number]. All rights reserved. This information is not intended as a substitute for professional medical care. Always follow your healthcare professional's instructions.

## 2018-02-23 NOTE — NURSING NOTE
Patient presents today with complaints of sinus congestion, sinus pressure, and a head that has been present for about 2 weeks.  Nila Zafar LPN .............2/23/2018  3:31 PM

## 2018-02-23 NOTE — PROGRESS NOTES
HPI Comments: Nursing Notes:   Nila Zafar LPN  2/23/2018  3:31 PM  Unsigned  Patient presents today with complaints of sinus congestion, sinus   pressure, and a head that has been present for about 2 weeks.  Nila Zafar LPN .............2/23/2018  3:31 PM    Nose is plugged up and pressure feeling. Headache. Possible sinus infection. Nose has been funky for several weeks now. Denies cough, or fevers. Treating symptoms with Ibuprofen which is helpful for headache.    Sinus Problem    Associated symptoms include congestion. Pertinent negatives include no cough.         Review of Systems   Constitutional: Negative for fever.   HENT: Positive for congestion.    Respiratory: Negative for cough.    Neurological: Positive for headaches.         Physical Exam   Constitutional: He is well-developed, well-nourished, and in no distress.   HENT:   Right Ear: External ear normal.   Left Ear: External ear normal.   Mouth/Throat: Oropharyngeal exudate present.   Cardiovascular: Normal heart sounds.    Pulmonary/Chest: Breath sounds normal.   Neurological: He is alert.   Skin: Skin is warm.   Psychiatric: Affect normal.     Assessment: On exam, well appearing male without fever, lungs clear to ausculation, TMs without erythema, tonsils without erythema    Diagnosis: Sinusitis    Plan: Treat with Omnicef 300 mgs PO BID 7 days  Follow up as needed

## 2018-05-18 ENCOUNTER — OFFICE VISIT (OUTPATIENT)
Dept: FAMILY MEDICINE | Facility: OTHER | Age: 23
End: 2018-05-18
Attending: NURSE PRACTITIONER
Payer: COMMERCIAL

## 2018-05-18 VITALS
WEIGHT: 130.7 LBS | TEMPERATURE: 97 F | DIASTOLIC BLOOD PRESSURE: 80 MMHG | BODY MASS INDEX: 19.36 KG/M2 | HEIGHT: 69 IN | SYSTOLIC BLOOD PRESSURE: 122 MMHG | HEART RATE: 87 BPM

## 2018-05-18 DIAGNOSIS — R07.0 THROAT PAIN: Primary | ICD-10-CM

## 2018-05-18 DIAGNOSIS — H92.09 EAR ACHE: ICD-10-CM

## 2018-05-18 PROCEDURE — 99213 OFFICE O/P EST LOW 20 MIN: CPT | Performed by: NURSE PRACTITIONER

## 2018-05-18 PROCEDURE — G0463 HOSPITAL OUTPT CLINIC VISIT: HCPCS

## 2018-05-18 ASSESSMENT — PAIN SCALES - GENERAL: PAINLEVEL: MODERATE PAIN (4)

## 2018-05-18 NOTE — NURSING NOTE
Sore Throat  Onset:  Couple of days  Fever:  no  Exposure: no   Pain scale:  4  Headache:  no  Rash:  no  Associated symptoms:  Started with ear pain, no cough  Aylin Rinaldi LPN .............5/18/2018  4:50 PM

## 2018-05-18 NOTE — PROGRESS NOTES
HPI:    Felix Gutierrez is a 23 year old male  who presents to clinic today for ear and throat pain.    States ear pain for about a week and a half and now with sore throat for the past couple of days.  Ear pain lessened today.  Mild pain with swallowing.  No known fevers.  Minimal runny/stuffy nose.  Feeling of post nasal drainage.  No sinus pain.   No cough.  States asthma controlled without any concerns today.  No watery or itchy eyes.  Occasional sneezing.  No change in appetite.  No change in energy.  Taking Claritin daily.      Past Medical History:   Diagnosis Date     Asthma with status asthmaticus     12/30/2013     Other allergy status, other than to drugs and biological substances     Allergy (dogs, cats, penicillin, mold)     Uncomplicated asthma     1997 and 1998,Asthma,1997 or 1998Hospitalized for asthma     History reviewed. No pertinent surgical history.  Social History   Substance Use Topics     Smoking status: Former Smoker     Packs/day: 1.00     Types: Cigarettes     Quit date: 1/31/2015     Smokeless tobacco: Never Used     Alcohol use 0.0 oz/week      Comment: Alcoholic Drinks/day: occasional     Current Outpatient Prescriptions   Medication Sig Dispense Refill     albuterol (2.5 MG/3ML) 0.083% neb solution Inhale 2.5 mg into the lungs every 4 hours as needed       albuterol (VENTOLIN HFA) 108 (90 BASE) MCG/ACT Inhaler Inhale 2 puffs into the lungs every 4 hours as needed       BUDESONIDE, INHALATION, 90 MCG/ACT AEPB Inhale 1 puff into the lungs 2 times daily       ipratropium - albuterol 0.5 mg/2.5 mg/3 mL (DUONEB) 0.5-2.5 (3) MG/3ML neb solution Inhale 3 mLs into the lungs 4 times daily as needed       loratadine (CLARITIN) 10 MG tablet Take 10 mg by mouth daily       Allergies   Allergen Reactions     Penicillins Rash         Past medical history, past surgical history, current medications and allergies reviewed and accurate to the best of my knowledge.        ROS:  Refer to HPI    BP  "122/80 (BP Location: Right arm, Patient Position: Sitting, Cuff Size: Adult Regular)  Pulse 87  Temp 97  F (36.1  C) (Tympanic)  Ht 5' 8.5\" (1.74 m)  Wt 130 lb 11.2 oz (59.3 kg)  BMI 19.58 kg/m2    EXAM:  General Appearance: Well appearing adult male, appropriate appearance for age. No acute distress  Head: normocephalic, atraumatic  Ears: Left TM grey, translucent with bony landmarks appreciated, no erythema, no effusion, no bulging, no purulence.  Right TM grey, translucent with bony landmarks appreciated, no erythema, no effusion, no bulging, no purulence.  Left auditory canal clear.  Right auditory canal clear.  Normal external ears, non tender.  Eyes: conjunctivae normal without erythema or irritation, no drainage or crusting, no eyelid swelling, pupils equal   Orophayrnx: moist mucous membranes, posterior pharynx with mild erythema, tonsils without hypertrophy, no erythema, no exudates or petechiae, no ulcers, no post nasal drip seen, no trismus.    Sinuses:  No sinus tenderness upon palpation of the frontal or maxillary sinuses  Nose:  Bilateral nares: no erythema, no edema, no drainage or congestion noted  Neck: supple without adenopathy  Respiratory: normal chest wall and respirations.  Normal effort.  Clear to auscultation bilaterally, no wheezing, crackles or rhonchi.  No increased work of breathing.  No cough appreciated.   Cardiac: RRR with no murmurs  Musculoskeletal:  Normal gait.  Equal movement of bilateral upper extremities.  Equal movement of bilateral lower extremities.    Psychological: normal affect, alert and pleasant      Labs:  Patient declines strep testing        ASSESSMENT/PLAN:    ICD-10-CM    1. Throat pain R07.0    2. Ear ache H92.09          No indications of ear infection on exam    Throat with irritated appearance - suspect post nasal drainage.  Offered strep testing, but unlikely as no lymph nodes or fevers.  Patient declines testing.    May try Benadryl at bedtime PRN for " post nasal drainage      Symptomatic treatment - Encouraged fluids, salt water gargles, honey, elevation, humidifier, lozenges, etc     Tylenol or ibuprofen PRN    Discussed warning signs/symptoms indicative of need to f/u    Follow up if symptoms persist or worsen or concerns

## 2018-05-18 NOTE — MR AVS SNAPSHOT
After Visit Summary   5/18/2018    Felix Gutierrez    MRN: [id number]           Patient Information     Date Of Birth          1995        Visit Information        Provider Department      5/18/2018 4:45 PM Daniella Silveira NP [facility]        Today's Diagnoses     Throat pain    -  1    Ear ache          Care Instructions      May try benadryl at bedtime for post nasal drainage  May try honey, tea, lozenges (cough drops), salt water gargles, etc  Follow up if persisting, worsening, or concerns    Earache, No Infection (Adult)  Earaches can happen without an infection. This occurs when air and fluid build up behind the eardrum causing a feeling of fullness and discomfort and reduced hearing. This is called otitis media with effusion (OME) or serous otitis media. It means there is fluid in the middle ear. It is not the same as acute otitis media, which is typically from infection.  OME can happen when you have a cold if congestion blocks the passage that drains the middle ear. This passage is called the eustachian tube. OME may also occur with nasal allergies or after a bacterial middle ear infection.    The pain or discomfort may come and go. You may hear clicking or popping sounds when you chew or swallow. You may feel that your balance is off. Or you may hear ringing in the ear.  It often takes from several weeks up to 3 months for the fluid to clear on its own. Oral pain relievers and ear drops help if there is pain. Decongestants and antihistamines sometimes help. Antibiotics don't help since there is no infection. Your doctor may prescribe a nasal spray to help reduce swelling in the nose and eustachian tube. This can allow the ear to drain.  If your OME doesn't improve after 3 months, surgery may be used to drain the fluid and insert a small tube in the eardrum to allow continued drainage.  Because the middle ear fluid can become infected, it is important to watch  for signs of an ear infection which may develop later. These signs include increased ear pain, fever, or drainage from the ear.  Home care  The following guidelines will help you care for yourself at home:    You may use over-the-counter medicine as directed to control pain, unless another medicine was prescribed. If you have chronic liver or kidney disease or ever had a stomach ulcer or GI bleeding, talk with your doctor before using these medicines. Aspirin should never be used in anyone under 18 years of age who is ill with a fever. It may cause severe liver damage.    You may use over-the-counter decongestants such as phenylephrine or pseudoephedrine. But they are not always helpful. Don't use nasal spray decongestants more than 3 days. Longer use can make congestion worse. Prescription nasal sprays from your doctor don't typically have those restrictions.    Antihistamines may help if you are also having allergy symptoms.    You may use medicines such as guaifenesin to thin mucus and promote drainage.  Follow-up care  Follow up with your healthcare provider or as advised if you are not feeling better after 3 days.  When to seek medical advice  Call your healthcare provider right away if any of the following occur:    Your ear pain gets worse or does not start to improve     Fever of 100.4 F (38 C) or higher, or as directed by your healthcare provider    Fluid or blood draining from the ear    Headache or sinus pain    Stiff neck    Unusual drowsiness or confusion  Date Last Reviewed: 10/1/2016    7018-4542 [employer]. [address] [place] [address]. All rights reserved. This information is not intended as a substitute for professional medical care. Always follow your healthcare professional's instructions.                Follow-ups after your visit        Who to contact     If you have questions or need follow up information about today's clinic visit or your schedule please contact  "[facility] directly at [phone].  Normal or non-critical lab and imaging results will be communicated to you by MyChart, letter or phone within 4 business days after the clinic has received the results. If you do not hear from us within 7 days, please contact the clinic through [facility] or phone. If you have a critical or abnormal lab result, we will notify you by phone as soon as possible.  Submit refill requests through Currently or call your pharmacy and they will forward the refill request to us. Please allow 3 business days for your refill to be completed.          Additional Information About Your Visit        Currently Information     Currently lets you send messages to your doctor, view your test results, renew your prescriptions, schedule appointments and more. To sign up, go to [email or web address] . Click on \"Log in\" on the left side of the screen, which will take you to the Welcome page. Then click on \"Sign up Now\" on the right side of the page.     You will be asked to enter the access code listed below, as well as some personal information. Please follow the directions to create your username and password.     Your access code is: [email or web address]  Expires: 2018  5:03 PM     Your access code will  in 90 days. If you need help or a new code, please call your [facility] clinic or [phone].        Care EveryWhere ID     This is your Care EveryWhere ID. This could be used by other organizations to access your [facility] medical records  [id number]        Your Vitals Were     Pulse Temperature Height BMI (Body Mass Index)          87 97  F (36.1  C) (Tympanic) 5' 8.5\" (1.74 m) 19.58 kg/m2         Blood Pressure from Last 3 Encounters:   18 122/80   18 138/82   17 110/60    Weight from Last 3 Encounters:   18 130 lb 11.2 oz (59.3 kg)   18 130 lb (59 kg)   17 123 lb (55.8 kg)              Today, you had the following     No orders found for " display       Primary Care Provider Office Phone # Fax #    Jose D Crawford [phone] [address]   [facility] [address]        Equal Access to Services     DEVANGSOCO CASTROTANNER : Hadii hannah cook irvingo Kaity, waaxda luqadaha, qaybta kaalmada adriane, shantanu bernstein lapinkyml aquino. So [facility] [phone].    ATENCIÓN: Si habla español, tiene a doherty disposición servicios gratuitos de asistencia lingüística. Llame al [phone].    We comply with applicable federal civil rights laws and [place] laws. We do not discriminate on the basis of race, color, national origin, age, disability, sex, sexual orientation, or gender identity.            Thank you!     Thank you for choosing [facility]  for your care. Our goal is always to provide you with excellent care. Hearing back from our patients is one way we can continue to improve our services. Please take a few minutes to complete the written survey that you may receive in the mail after your visit with us. Thank you!             Your Updated Medication List - Protect others around you: Learn how to safely use, store and throw away your medicines at www.disposemymeds.org.          This list is accurate as of 5/18/18  5:12 PM.  Always use your most recent med list.                   Brand Name Dispense Instructions for use Diagnosis    * albuterol (2.5 MG/3ML) 0.083% neb solution      Inhale 2.5 mg into the lungs every 4 hours as needed        * VENTOLIN  (90 Base) MCG/ACT Inhaler   Generic drug:  albuterol      Inhale 2 puffs into the lungs every 4 hours as needed        BUDESONIDE (INHALATION) 90 MCG/ACT Aepb      Inhale 1 puff into the lungs 2 times daily        ipratropium - albuterol 0.5 mg/2.5 mg/3 mL 0.5-2.5 (3) MG/3ML neb solution    DUONEB     Inhale 3 mLs into the lungs 4 times daily as needed        loratadine 10 MG tablet    CLARITIN     Take 10 mg by mouth daily        * Notice:  This list  has 2 medication(s) that are the same as other medications prescribed for you. Read the directions carefully, and ask your doctor or other care provider to review them with you.

## 2018-05-18 NOTE — PATIENT INSTRUCTIONS
May try benadryl at bedtime for post nasal drainage  May try honey, tea, lozenges (cough drops), salt water gargles, etc  Follow up if persisting, worsening, or concerns    Earache, No Infection (Adult)  Earaches can happen without an infection. This occurs when air and fluid build up behind the eardrum causing a feeling of fullness and discomfort and reduced hearing. This is called otitis media with effusion (OME) or serous otitis media. It means there is fluid in the middle ear. It is not the same as acute otitis media, which is typically from infection.  OME can happen when you have a cold if congestion blocks the passage that drains the middle ear. This passage is called the eustachian tube. OME may also occur with nasal allergies or after a bacterial middle ear infection.    The pain or discomfort may come and go. You may hear clicking or popping sounds when you chew or swallow. You may feel that your balance is off. Or you may hear ringing in the ear.  It often takes from several weeks up to 3 months for the fluid to clear on its own. Oral pain relievers and ear drops help if there is pain. Decongestants and antihistamines sometimes help. Antibiotics don't help since there is no infection. Your doctor may prescribe a nasal spray to help reduce swelling in the nose and eustachian tube. This can allow the ear to drain.  If your OME doesn't improve after 3 months, surgery may be used to drain the fluid and insert a small tube in the eardrum to allow continued drainage.  Because the middle ear fluid can become infected, it is important to watch for signs of an ear infection which may develop later. These signs include increased ear pain, fever, or drainage from the ear.  Home care  The following guidelines will help you care for yourself at home:    You may use over-the-counter medicine as directed to control pain, unless another medicine was prescribed. If you have chronic liver or kidney disease or ever had a  stomach ulcer or GI bleeding, talk with your doctor before using these medicines. Aspirin should never be used in anyone under 18 years of age who is ill with a fever. It may cause severe liver damage.    You may use over-the-counter decongestants such as phenylephrine or pseudoephedrine. But they are not always helpful. Don't use nasal spray decongestants more than 3 days. Longer use can make congestion worse. Prescription nasal sprays from your doctor don't typically have those restrictions.    Antihistamines may help if you are also having allergy symptoms.    You may use medicines such as guaifenesin to thin mucus and promote drainage.  Follow-up care  Follow up with your healthcare provider or as advised if you are not feeling better after 3 days.  When to seek medical advice  Call your healthcare provider right away if any of the following occur:    Your ear pain gets worse or does not start to improve     Fever of 100.4 F (38 C) or higher, or as directed by your healthcare provider    Fluid or blood draining from the ear    Headache or sinus pain    Stiff neck    Unusual drowsiness or confusion  Date Last Reviewed: 10/1/2016    5922-9891 [employer]. [address] [place] [address]. All rights reserved. This information is not intended as a substitute for professional medical care. Always follow your healthcare professional's instructions.

## 2018-06-27 DIAGNOSIS — J45.40 MODERATE PERSISTENT ASTHMA WITHOUT COMPLICATION: Primary | ICD-10-CM

## 2018-06-27 RX ORDER — ALBUTEROL SULFATE 90 UG/1
AEROSOL, METERED RESPIRATORY (INHALATION)
Qty: 2 INHALER | Refills: 5 | Status: SHIPPED | OUTPATIENT
Start: 2018-06-27 | End: 2022-04-21

## 2018-07-23 NOTE — PROGRESS NOTES
Patient Information     Patient Name  Felix Gutierrez MRN  [id number] Sex  Male   1995      Letter by Jose D Crawford MD at      Author:  Jose D Crawford MD Service:  (none) Author Type:  (none)    Filed:   Encounter Date:  2017 Status:  (Other)           Felix Gutierrez   [address] [place] [id number]          2017    Dear Mr. Gutierrez:    This is to remind you that you are due for an annual office visit with Jose D Crawford MD. Additional refills of your medication require you to complete this visit.    Please call [phone] to schedule your appointment.    Thank you for choosing [facility] for your health care needs.    Sincerely,      Refill RN  [facility]

## 2018-09-17 ENCOUNTER — OFFICE VISIT (OUTPATIENT)
Dept: FAMILY MEDICINE | Facility: OTHER | Age: 23
End: 2018-09-17
Payer: COMMERCIAL

## 2018-09-17 VITALS
OXYGEN SATURATION: 97 % | HEART RATE: 110 BPM | SYSTOLIC BLOOD PRESSURE: 120 MMHG | TEMPERATURE: 99.3 F | WEIGHT: 142.2 LBS | BODY MASS INDEX: 21.3 KG/M2 | DIASTOLIC BLOOD PRESSURE: 80 MMHG

## 2018-09-17 DIAGNOSIS — J06.9 VIRAL URI WITH COUGH: Primary | ICD-10-CM

## 2018-09-17 PROCEDURE — 99213 OFFICE O/P EST LOW 20 MIN: CPT | Performed by: NURSE PRACTITIONER

## 2018-09-17 PROCEDURE — G0463 HOSPITAL OUTPT CLINIC VISIT: HCPCS

## 2018-09-17 NOTE — NURSING NOTE
Patient presents to clinic today for a URI. Patient having facial pain and pressure. Coughing, headache, body aches, chest congestion, right ear feels plugged, post nasal drainage. Sx started 3 days ago.  Doreen Mcgarry CMA..............9/17/2018........1:53 PM    Medication Reconciliation: complete    Doreen Mcgarry CMA

## 2018-09-17 NOTE — PATIENT INSTRUCTIONS
Cold Medicines   What are cold medicines?  Symptoms of the common cold start gradually over several days and usually last about two weeks. Symptoms may include sneezing, a stuffy or runny nose, sore throat, cough, watery eyes, mild headache, or body aches. A cold will go away on its own without treatment. However, there are many nonprescription products that may help relieve some of the symptoms of a cold. Cold medicines often contain more than one ingredient and are used to treat more than one symptom. Read the labels and buy products that have only the ingredients that you need. If you are not sure which medicine is best, ask your pharmacist.  How do they work?  Decongestants reduce swelling in your nose and sinuses. They may also lessen the amount of mucus made by your nose. If you use decongestants more often than directed, your stuffy nose may get worse.   Antihistamines block the effect of histamine. Histamine is a chemical your body makes when you have an allergic reaction. Antihistamines are most often used to treat itchy or watery eyes or a stuffy or runny nose caused by an allergy. Antihistamines may not help a stuffy or runny nose caused by a cold because they can make mucus thick and dry.  Mucolytics are medicines that make mucus thinner so that it is easier to cough up out of your throat and lungs.  Expectorants are cough medicines that may help to keep the mucus thin and bring up mucus from the lungs when you cough. This may relieve chest congestion and make it easier to breathe.   Cough suppressants (antitussives) are medicines that lessen the urge to cough. They may give relief from a dry, hacking cough. If you have a cough that is wet sounding and produces mucus, it is important for you to cough the mucus up out of your lungs. For this reason, cough suppressants are not recommended for a wet sounding cough.  Fever and pain relievers, such as acetaminophen, aspirin, or other nonsteroidal  anti-inflammatory drugs (NSAIDs), are often included in cold medicine. Read labels carefully to avoid taking more medicine than you need.  What else do I need to know about this medicine?    Talk to your healthcare provider if your symptoms start suddenly or you have severe symptoms. This may mean you have something more serious than a cold.    Follow the directions that come with your medicine, including information about food or alcohol. Make sure you know how and when to take your medicine. Do not take more or less than you are supposed to take.    Try to get all of your medicine at the same place. Your pharmacist can help make sure that all of your medicines are safe to take together.    Keep a list of your medicines with you. List all of the prescription medicines, nonprescription medicines, supplements, natural remedies, and vitamins that you take. Tell all healthcare providers who treat you about all of the products you are taking.    Many medicines have side effects. A side effect is a symptom or problem that is caused by the medicine. Ask your healthcare provider or pharmacist what side effects the medicine may cause and what you should do if you have side effects.    Nonsteroidal anti-inflammatory medicines (NSAIDs), such as ibuprofen, naproxen, and aspirin, may cause stomach bleeding and other problems. These risks increase with age. Read the label and take as directed. Unless recommended by your healthcare provider, do not take for more than 10 days for any reason.    Acetaminophen may cause liver damage or other problems. Unless recommended by your provider, don't take more than 3000 milligrams (mg) in 24 hours. To make sure you don t take too much, check other medicines you take to see if they also contain acetaminophen. Ask your provider if you need to avoid drinking alcohol while taking this medicine.  If you have any questions, ask your healthcare provider or pharmacist for more information. Be sure  to keep all appointments for provider visits or tests.      Symptoms likely due to virus. No antibiotic is needed at this time. Symptoms typically worse on days 2-5 and then stabilize and you are sick for days 5-12. Days 12-14 there is slow resolution and if there is a cough, studies show it can linger longer, however one is not as ill as in the beginning. If symptoms begin worsening or fail to improve after 14 days, return to clinic for reevaluation.

## 2018-09-17 NOTE — MR AVS SNAPSHOT
After Visit Summary   9/17/2018    Felix Gutierrez    MRN: [id number]           Patient Information     Date Of Birth          1995        Visit Information        Provider Department      9/17/2018 1:45 PM Brianne Clarke NP [facility]        Care Instructions    Cold Medicines   What are cold medicines?  Symptoms of the common cold start gradually over several days and usually last about two weeks. Symptoms may include sneezing, a stuffy or runny nose, sore throat, cough, watery eyes, mild headache, or body aches. A cold will go away on its own without treatment. However, there are many nonprescription products that may help relieve some of the symptoms of a cold. Cold medicines often contain more than one ingredient and are used to treat more than one symptom. Read the labels and buy products that have only the ingredients that you need. If you are not sure which medicine is best, ask your pharmacist.  How do they work?  Decongestants reduce swelling in your nose and sinuses. They may also lessen the amount of mucus made by your nose. If you use decongestants more often than directed, your stuffy nose may get worse.   Antihistamines block the effect of histamine. Histamine is a chemical your body makes when you have an allergic reaction. Antihistamines are most often used to treat itchy or watery eyes or a stuffy or runny nose caused by an allergy. Antihistamines may not help a stuffy or runny nose caused by a cold because they can make mucus thick and dry.  Mucolytics are medicines that make mucus thinner so that it is easier to cough up out of your throat and lungs.  Expectorants are cough medicines that may help to keep the mucus thin and bring up mucus from the lungs when you cough. This may relieve chest congestion and make it easier to breathe.   Cough suppressants (antitussives) are medicines that lessen the urge to cough. They may give relief from a dry, hacking  cough. If you have a cough that is wet sounding and produces mucus, it is important for you to cough the mucus up out of your lungs. For this reason, cough suppressants are not recommended for a wet sounding cough.  Fever and pain relievers, such as acetaminophen, aspirin, or other nonsteroidal anti-inflammatory drugs (NSAIDs), are often included in cold medicine. Read labels carefully to avoid taking more medicine than you need.  What else do I need to know about this medicine?    Talk to your healthcare provider if your symptoms start suddenly or you have severe symptoms. This may mean you have something more serious than a cold.    Follow the directions that come with your medicine, including information about food or alcohol. Make sure you know how and when to take your medicine. Do not take more or less than you are supposed to take.    Try to get all of your medicine at the same place. Your pharmacist can help make sure that all of your medicines are safe to take together.    Keep a list of your medicines with you. List all of the prescription medicines, nonprescription medicines, supplements, natural remedies, and vitamins that you take. Tell all healthcare providers who treat you about all of the products you are taking.    Many medicines have side effects. A side effect is a symptom or problem that is caused by the medicine. Ask your healthcare provider or pharmacist what side effects the medicine may cause and what you should do if you have side effects.    Nonsteroidal anti-inflammatory medicines (NSAIDs), such as ibuprofen, naproxen, and aspirin, may cause stomach bleeding and other problems. These risks increase with age. Read the label and take as directed. Unless recommended by your healthcare provider, do not take for more than 10 days for any reason.    Acetaminophen may cause liver damage or other problems. Unless recommended by your provider, don't take more than 3000 milligrams (mg) in 24 hours.  "To make sure you don t take too much, check other medicines you take to see if they also contain acetaminophen. Ask your provider if you need to avoid drinking alcohol while taking this medicine.  If you have any questions, ask your healthcare provider or pharmacist for more information. Be sure to keep all appointments for provider visits or tests.      Symptoms likely due to virus. No antibiotic is needed at this time. Symptoms typically worse on days 2-5 and then stabilize and you are sick for days 5-12. Days 12-14 there is slow resolution and if there is a cough, studies show it can linger longer, however one is not as ill as in the beginning. If symptoms begin worsening or fail to improve after 14 days, return to clinic for reevaluation.             Follow-ups after your visit        Who to contact     If you have questions or need follow up information about today's clinic visit or your schedule please contact [facility] [place] [facility] AND \[facility]\"" directly at [phone].  Normal or non-critical lab and imaging results will be communicated to you by [employer], letter or phone within 4 business days after the clinic has received the results. If you do not hear from us within 7 days, please contact the clinic through [employer] or phone. If you have a critical or abnormal lab result, we will notify you by phone as soon as possible.  Submit refill requests through [employer] or call your pharmacy and they will forward the refill request to us. Please allow 3 business days for your refill to be completed.          Additional Information About Your Visit        [employer] Information     [employer] lets you send messages to your doctor, view your test results, renew your prescriptions, schedule appointments and more. To sign up, go to [email or web address] . Click on \"Log in\" on the left side of the screen, which will take you to the Welcome page. Then click on \"Sign up Now\" on the right side of the page.     You will be asked to " enter the access code listed below, as well as some personal information. Please follow the directions to create your username and password.     Your access code is: [email or web address]  Expires: 2018  2:13 PM     Your access code will  in 90 days. If you need help or a new code, please call your [facility] clinic or [phone].        Care EveryWhere ID     This is your Care EveryWhere ID. This could be used by other organizations to access your [facility] medical records  [id number]        Your Vitals Were     Pulse Temperature Pulse Oximetry BMI (Body Mass Index)          110 99.3  F (37.4  C) (Tympanic) 97% 21.3 kg/m2         Blood Pressure from Last 3 Encounters:   18 120/80   18 122/80   18 138/82    Weight from Last 3 Encounters:   18 142 lb 3.2 oz (64.5 kg)   18 130 lb 11.2 oz (59.3 kg)   18 130 lb (59 kg)              Today, you had the following     No orders found for display       Primary Care Provider Office Phone # Fax #    Jose D Crawford [phone] [address] [facility] [address]        Equal Access to Services     SOCO LIZARRAGA AH: Hadii hannah villatoroo Solucius, waaxda luqadaha, qaybta kaalmada adeegyada, shantanu murguia . So [facility] [phone].    ATENCIÓN: Si habla español, tiene a doherty disposición servicios gratuitos de asistencia lingüística. Llame al [phone].    We comply with applicable federal civil rights laws and [place] laws. We do not discriminate on the basis of race, color, national origin, age, disability, sex, sexual orientation, or gender identity.            Thank you!     Thank you for choosing [facility] hospitals  for your care. Our goal is always to provide you with excellent care. Hearing back from our patients is one way we can continue to improve our services. Please take a few minutes to complete the written survey that you may receive in the mail after your  visit with us. Thank you!             Your Updated Medication List - Protect others around you: Learn how to safely use, store and throw away your medicines at www.disposemymeds.org.          This list is accurate as of 9/17/18  2:13 PM.  Always use your most recent med list.                   Brand Name Dispense Instructions for use Diagnosis    BUDESONIDE (INHALATION) 90 MCG/ACT Aepb      Inhale 1 puff into the lungs 2 times daily        ipratropium - albuterol 0.5 mg/2.5 mg/3 mL 0.5-2.5 (3) MG/3ML neb solution    DUONEB     Inhale 3 mLs into the lungs 4 times daily as needed        loratadine 10 MG tablet    CLARITIN     Take 10 mg by mouth daily        VENTOLIN  (90 Base) MCG/ACT inhaler   Generic drug:  albuterol     2 Inhaler    INHALE TWO PUFFS BY MOUTH EVERY 4 HOURS AS NEEDED FOR SHORTNESS OF BREATH OR  WHEEZING    Moderate persistent asthma without complication

## 2018-09-17 NOTE — PROGRESS NOTES
Nursing Notes:   Doreen Mcgarry CMA  9/17/2018  1:59 PM  Signed  Patient presents to clinic today for a URI. Patient having facial pain and pressure. Coughing, headache, body aches, chest congestion, right ear feels plugged, post nasal drainage. Sx started 3 days ago.  Doreen Mcgarry CMA..............9/17/2018........1:53 PM    Medication Reconciliation: complete    Doreen Mcgarry CMA        SUBJECTIVE:   Felix Gutierrez is a 23 year old male who presents to clinic today for the following health issues:    RESPIRATORY SYMPTOMS      Duration: 3 days    Description  nasal congestion, rhinorrhea, sore throat, cough, ear pain bilateral, headache and myalgias    Severity: moderate    Accompanying signs and symptoms: Sinus pressure, Cough developed today, non productive, no SOB or wheezing.     History (predisposing factors):  asthma    Precipitating or alleviating factors: None    Therapies tried and outcome:  rest and fluids OTC NSAID        Problem list and histories reviewed & adjusted, as indicated.  Additional history: as documented    Current Outpatient Prescriptions   Medication Sig Dispense Refill     BUDESONIDE, INHALATION, 90 MCG/ACT AEPB Inhale 1 puff into the lungs 2 times daily       ipratropium - albuterol 0.5 mg/2.5 mg/3 mL (DUONEB) 0.5-2.5 (3) MG/3ML neb solution Inhale 3 mLs into the lungs 4 times daily as needed       loratadine (CLARITIN) 10 MG tablet Take 10 mg by mouth daily       VENTOLIN  (90 Base) MCG/ACT Inhaler INHALE TWO PUFFS BY MOUTH EVERY 4 HOURS AS NEEDED FOR SHORTNESS OF BREATH OR  WHEEZING 2 Inhaler 5     [DISCONTINUED] albuterol (2.5 MG/3ML) 0.083% neb solution Inhale 2.5 mg into the lungs every 4 hours as needed       Allergies   Allergen Reactions     Penicillins Rash         ROS:  Notable findings in the HPI.       OBJECTIVE:     /80  Pulse 110  Temp 99.3  F (37.4  C) (Tympanic)  Wt 142 lb 3.2 oz (64.5 kg)  SpO2 97%  BMI 21.3 kg/m2  Body mass index is 21.3  kg/(m^2).  GENERAL: healthy, alert and no distress  EYES: Eyes grossly normal to inspection  HENT: normal cephalic/atraumatic, right ear: normal: no effusions, no erythema, normal landmarks, left ear: normal: no effusions, no erythema, normal landmarks, nose and mouth without ulcers or lesions, nasal mucosa edematous , rhinorrhea clear, oropharynx clear, oral mucous membranes moist and sinuses: not tender  RESP: lungs clear to auscultation - no rales, rhonchi or wheezes and no cough heard  CV: regular rates and rhythm, normal S1 S2, no S3 or S4 and no murmur, click or rub  SKIN: no suspicious lesions or rashes    Diagnostic Test Results:  none     ASSESSMENT/PLAN:     1. Viral URI with cough      PLAN:    URI Adult:  Tylenol, Ibuprofen, Fluids, Rest, OTC cough suppressant/expectorant, OTC decongestant/antihistamine, Saline gargles, Saline nasal spray and Vaporizer  Symptoms likely due to virus. No antibiotic is needed at this time. Symptoms typically worse on days 2-5 and then stabilize and you are sick for days 5-12. Days 12-14 there is slow resolution and if there is a cough, studies show it can linger longer, however one is not as ill as in the beginning. If symptoms begin worsening or fail to improve after 14 days, return to clinic for reevaluation. All questions were answered and he is in agreement with plan.       Followup:    If not improving or if condition worsens, follow up with your Primary Care Provider    I explained my diagnostic considerations and recommendations to the patient, who voiced understanding and agreement with the treatment plan. All questions were answered. We discussed potential side effects of any prescribed or recommended therapies, as well as expectations for response to treatments. He was advised to contact our office if there is no improvement or worsening of conditions or symptoms.  If s/s worsen or persist, patient will either come back or follow up with PCP.    Disclaimer:  This  note consists of words and symbols derived from keyboarding, dictation, or using voice recognition software. As a result, there may be errors in the script that have gone undetected. Please consider this when interpreting information found in this note.      Brianne Clarke NP, 9/17/2018 2:05 PM

## 2021-03-17 ENCOUNTER — OFFICE VISIT (OUTPATIENT)
Dept: FAMILY MEDICINE | Facility: OTHER | Age: 26
End: 2021-03-17
Attending: FAMILY MEDICINE
Payer: COMMERCIAL

## 2021-03-17 VITALS
RESPIRATION RATE: 18 BRPM | OXYGEN SATURATION: 99 % | HEART RATE: 124 BPM | WEIGHT: 155.4 LBS | SYSTOLIC BLOOD PRESSURE: 148 MMHG | BODY MASS INDEX: 23.28 KG/M2 | DIASTOLIC BLOOD PRESSURE: 88 MMHG | TEMPERATURE: 98.4 F

## 2021-03-17 DIAGNOSIS — R03.0 ELEVATED SYSTOLIC BLOOD PRESSURE READING WITHOUT DIAGNOSIS OF HYPERTENSION: ICD-10-CM

## 2021-03-17 DIAGNOSIS — H69.92 DYSFUNCTION OF LEFT EUSTACHIAN TUBE: Primary | ICD-10-CM

## 2021-03-17 PROCEDURE — 99213 OFFICE O/P EST LOW 20 MIN: CPT | Performed by: FAMILY MEDICINE

## 2021-03-17 PROCEDURE — G0463 HOSPITAL OUTPT CLINIC VISIT: HCPCS

## 2021-03-17 ASSESSMENT — PAIN SCALES - GENERAL: PAINLEVEL: NO PAIN (1)

## 2021-03-17 NOTE — PROGRESS NOTES
SUBJECTIVE:   Felix Gutierrez is a 25 year old male who presents to clinic today for the following health issues:    L sided Ear/jaw pain mostly with chewing and yawning. No fevers. No cold symptoms.     Feels anxious about coming to the clinic. Running late on his way here and states he was speeding. Thinks this is why his blood pressure is elevated.         OBJECTIVE:     BP (!) 148/88 (BP Location: Right arm, Patient Position: Sitting, Cuff Size: Adult Regular)   Pulse 124   Temp 98.4  F (36.9  C) (Tympanic)   Resp 18   Wt 70.5 kg (155 lb 6.4 oz)   SpO2 99%   BMI 23.28 kg/m    Body mass index is 23.28 kg/m .  Physical Exam  Constitutional:       Appearance: He is well-developed.   HENT:      Ears:      Comments: No TMJ tenderness. Normal ear exam B. Discomfort in area posterior to ear, no tenderness.   Eyes:      Conjunctiva/sclera: Conjunctivae normal.   Neck:      Thyroid: No thyromegaly.   Cardiovascular:      Rate and Rhythm: Normal rate and regular rhythm.      Heart sounds: Normal heart sounds. No murmur.   Pulmonary:      Effort: Pulmonary effort is normal. No respiratory distress.      Breath sounds: Normal breath sounds.   Abdominal:      Palpations: Abdomen is soft.   Lymphadenopathy:      Cervical: No cervical adenopathy.   Skin:     Findings: No rash.   Neurological:      Mental Status: He is alert and oriented to person, place, and time.           ASSESSMENT/PLAN:           ICD-10-CM    1. Dysfunction of left eustachian tube  H69.82    2. Elevated systolic blood pressure reading without diagnosis of hypertension  R03.0      Recommend trial of flonase nasal spray, resume anti-histamine for possible improvement in symptoms that seem to be due to ETD. Normal exam.     blood pressure improved on repeat (previously 168/88). Patient notes feeling anxious/rushed to get here. Typically has elevated heart rate with anxiety. Continue to monitor and follow up if ongoing problems. Discussed ways to  monitor his bp in the community.     Elizabeth Proctor MD  [facility] [place]

## 2021-03-17 NOTE — NURSING NOTE
"Patient here for left ear pain for 3-4 days now. Not sure if it is the ear or the jaw joint. Hurts when chewing, or opening mouth big.   Bernadette Christensen LPN ..........3/17/2021 1:52 PM   Chief Complaint   Patient presents with     Pain     left ear       Initial BP (!) 168/88 (BP Location: Right arm, Patient Position: Sitting, Cuff Size: Adult Regular)   Pulse 124   Temp 98.4  F (36.9  C) (Tympanic)   Resp 18   Wt 70.5 kg (155 lb 6.4 oz)   SpO2 99%   BMI 23.28 kg/m   Estimated body mass index is 23.28 kg/m  as calculated from the following:    Height as of 5/18/18: 1.74 m (5' 8.5\").    Weight as of this encounter: 70.5 kg (155 lb 6.4 oz).  Medication Reconciliation: complete    Bernadette Christensen LPN    "

## 2021-04-20 ENCOUNTER — ALLIED HEALTH/NURSE VISIT (OUTPATIENT)
Dept: FAMILY MEDICINE | Facility: OTHER | Age: 26
End: 2021-04-20
Attending: FAMILY MEDICINE
Payer: COMMERCIAL

## 2021-04-20 DIAGNOSIS — Z20.822 EXPOSURE TO COVID-19 VIRUS: Primary | ICD-10-CM

## 2021-04-20 PROCEDURE — C9803 HOPD COVID-19 SPEC COLLECT: HCPCS

## 2021-04-20 PROCEDURE — U0003 INFECTIOUS AGENT DETECTION BY NUCLEIC ACID (DNA OR RNA); SEVERE ACUTE RESPIRATORY SYNDROME CORONAVIRUS 2 (SARS-COV-2) (CORONAVIRUS DISEASE [COVID-19]), AMPLIFIED PROBE TECHNIQUE, MAKING USE OF HIGH THROUGHPUT TECHNOLOGIES AS DESCRIBED BY CMS-2020-01-R: HCPCS | Mod: ZL | Performed by: FAMILY MEDICINE

## 2021-04-20 PROCEDURE — U0005 INFEC AGEN DETEC AMPLI PROBE: HCPCS | Mod: ZL | Performed by: FAMILY MEDICINE

## 2021-04-21 LAB
LABORATORY COMMENT REPORT: NORMAL
SARS-COV-2 RNA RESP QL NAA+PROBE: NEGATIVE
SARS-COV-2 RNA RESP QL NAA+PROBE: NORMAL
SPECIMEN SOURCE: NORMAL
SPECIMEN SOURCE: NORMAL

## 2021-04-22 ENCOUNTER — TELEPHONE (OUTPATIENT)
Dept: FAMILY MEDICINE | Facility: OTHER | Age: 26
End: 2021-04-22

## 2021-04-22 NOTE — TELEPHONE ENCOUNTER
After verification of patient name and date of birth, patient notified of results per testing date 04/20/2021. Patient verbalizes understanding and has no further questions or concerns. Devora Peacock RN ....................  4/22/2021   11:15 AM

## 2021-10-20 ENCOUNTER — ALLIED HEALTH/NURSE VISIT (OUTPATIENT)
Dept: FAMILY MEDICINE | Facility: OTHER | Age: 26
End: 2021-10-20
Attending: FAMILY MEDICINE
Payer: COMMERCIAL

## 2021-10-20 DIAGNOSIS — R05.9 COUGH: ICD-10-CM

## 2021-10-20 DIAGNOSIS — Z20.822 EXPOSURE TO 2019 NOVEL CORONAVIRUS: ICD-10-CM

## 2021-10-20 DIAGNOSIS — Z20.822 COVID-19 RULED OUT: ICD-10-CM

## 2021-10-20 DIAGNOSIS — R09.89 RUNNY NOSE: Primary | ICD-10-CM

## 2021-10-20 PROCEDURE — C9803 HOPD COVID-19 SPEC COLLECT: HCPCS

## 2021-10-20 PROCEDURE — U0003 INFECTIOUS AGENT DETECTION BY NUCLEIC ACID (DNA OR RNA); SEVERE ACUTE RESPIRATORY SYNDROME CORONAVIRUS 2 (SARS-COV-2) (CORONAVIRUS DISEASE [COVID-19]), AMPLIFIED PROBE TECHNIQUE, MAKING USE OF HIGH THROUGHPUT TECHNOLOGIES AS DESCRIBED BY CMS-2020-01-R: HCPCS | Mod: ZL

## 2021-10-22 LAB — SARS-COV-2 RNA RESP QL NAA+PROBE: NEGATIVE

## 2021-12-04 ENCOUNTER — HEALTH MAINTENANCE LETTER (OUTPATIENT)
Age: 26
End: 2021-12-04

## 2021-12-20 ENCOUNTER — E-VISIT (OUTPATIENT)
Dept: URGENT CARE | Facility: CLINIC | Age: 26
End: 2021-12-20
Payer: COMMERCIAL

## 2021-12-20 ENCOUNTER — TELEPHONE (OUTPATIENT)
Dept: FAMILY MEDICINE | Facility: OTHER | Age: 26
End: 2021-12-20

## 2021-12-20 DIAGNOSIS — R05.9 COUGH: Primary | ICD-10-CM

## 2021-12-20 PROCEDURE — 99207 PR NON-BILLABLE SERV PER CHARTING: CPT | Performed by: FAMILY MEDICINE

## 2021-12-20 NOTE — PATIENT INSTRUCTIONS
Dear Felix Gutierrez,    We are sorry you are not feeling well. Based on the responses you provided, it is recommended that you be seen in-person in urgent care so we can better evaluate your symptoms. Please click here to find the nearest urgent care location to you.   You will not be charged for this Visit. Thank you for trusting us with your care.    Christin De La Rosa MD

## 2021-12-20 NOTE — TELEPHONE ENCOUNTER
Patients daughter tested positive for Influenza A and he wanted to see about getting a prescription for himself. He is currently not having any symptoms. I told him to follow up if he started having symptoms.    Belen Fofana LPN on 12/20/2021 at 9:25 AM

## 2021-12-20 NOTE — TELEPHONE ENCOUNTER
Patient has some questions regarding influenza A.  Hi daughter now has it.  Please call      Ade Skelton on 12/20/2021 at 8:57 AM

## 2022-04-21 ENCOUNTER — OFFICE VISIT (OUTPATIENT)
Dept: FAMILY MEDICINE | Facility: OTHER | Age: 27
End: 2022-04-21
Attending: PHYSICIAN ASSISTANT
Payer: COMMERCIAL

## 2022-04-21 VITALS
SYSTOLIC BLOOD PRESSURE: 144 MMHG | DIASTOLIC BLOOD PRESSURE: 105 MMHG | WEIGHT: 149.6 LBS | BODY MASS INDEX: 22.67 KG/M2 | OXYGEN SATURATION: 95 % | HEIGHT: 68 IN | TEMPERATURE: 98.4 F | RESPIRATION RATE: 22 BRPM | HEART RATE: 106 BPM

## 2022-04-21 DIAGNOSIS — J45.21 MILD INTERMITTENT ASTHMA WITH EXACERBATION: Primary | ICD-10-CM

## 2022-04-21 DIAGNOSIS — J45.40 MODERATE PERSISTENT ASTHMA WITHOUT COMPLICATION: ICD-10-CM

## 2022-04-21 DIAGNOSIS — R03.0 ELEVATED BLOOD PRESSURE READING WITHOUT DIAGNOSIS OF HYPERTENSION: ICD-10-CM

## 2022-04-21 DIAGNOSIS — J30.1 SEASONAL ALLERGIC RHINITIS DUE TO POLLEN: ICD-10-CM

## 2022-04-21 PROCEDURE — 99213 OFFICE O/P EST LOW 20 MIN: CPT | Performed by: PHYSICIAN ASSISTANT

## 2022-04-21 PROCEDURE — G0463 HOSPITAL OUTPT CLINIC VISIT: HCPCS

## 2022-04-21 RX ORDER — ALBUTEROL SULFATE 0.83 MG/ML
2.5 SOLUTION RESPIRATORY (INHALATION) EVERY 6 HOURS PRN
Qty: 90 ML | Refills: 4 | Status: SHIPPED | OUTPATIENT
Start: 2022-04-21

## 2022-04-21 RX ORDER — FLUTICASONE PROPIONATE 50 MCG
2 SPRAY, SUSPENSION (ML) NASAL DAILY
Qty: 16 G | Refills: 11 | Status: SHIPPED | OUTPATIENT
Start: 2022-04-21

## 2022-04-21 RX ORDER — PREDNISONE 20 MG/1
TABLET ORAL
Qty: 15 TABLET | Refills: 0 | Status: SHIPPED | OUTPATIENT
Start: 2022-04-21 | End: 2022-05-01

## 2022-04-21 RX ORDER — ALBUTEROL SULFATE 90 UG/1
AEROSOL, METERED RESPIRATORY (INHALATION)
Qty: 54 G | Refills: 3 | Status: SHIPPED | OUTPATIENT
Start: 2022-04-21

## 2022-04-21 RX ORDER — LORATADINE 10 MG/1
10 TABLET ORAL DAILY
Qty: 90 TABLET | Refills: 3 | Status: SHIPPED | OUTPATIENT
Start: 2022-04-21

## 2022-04-21 ASSESSMENT — ASTHMA QUESTIONNAIRES
QUESTION_5 LAST FOUR WEEKS HOW WOULD YOU RATE YOUR ASTHMA CONTROL: POORLY CONTROLLED
QUESTION_1 LAST FOUR WEEKS HOW MUCH OF THE TIME DID YOUR ASTHMA KEEP YOU FROM GETTING AS MUCH DONE AT WORK, SCHOOL OR AT HOME: NONE OF THE TIME
ACT_TOTALSCORE: 11
QUESTION_2 LAST FOUR WEEKS HOW OFTEN HAVE YOU HAD SHORTNESS OF BREATH: ONCE A DAY
ACUTE_EXACERBATION_TODAY: YES
ACT_TOTALSCORE: 11
QUESTION_4 LAST FOUR WEEKS HOW OFTEN HAVE YOU USED YOUR RESCUE INHALER OR NEBULIZER MEDICATION (SUCH AS ALBUTEROL): THREE OR MORE TIMES PER DAY
QUESTION_3 LAST FOUR WEEKS HOW OFTEN DID YOUR ASTHMA SYMPTOMS (WHEEZING, COUGHING, SHORTNESS OF BREATH, CHEST TIGHTNESS OR PAIN) WAKE YOU UP AT NIGHT OR EARLIER THAN USUAL IN THE MORNING: FOUR OR MORE NIGHTS A WEEK

## 2022-04-21 ASSESSMENT — ENCOUNTER SYMPTOMS
SORE THROAT: 0
PSYCHIATRIC NEGATIVE: 1
HEADACHES: 0
WHEEZING: 1
SHORTNESS OF BREATH: 1
DIZZINESS: 0
CHILLS: 0
DIARRHEA: 0
LIGHT-HEADEDNESS: 0
PALPITATIONS: 0
CONSTIPATION: 0
VOMITING: 0
RHINORRHEA: 0
COUGH: 1
STRIDOR: 0
NAUSEA: 0
FEVER: 0
MUSCULOSKELETAL NEGATIVE: 1

## 2022-04-21 ASSESSMENT — PAIN SCALES - GENERAL: PAINLEVEL: NO PAIN (0)

## 2022-04-21 NOTE — PROGRESS NOTES
Assessment & Plan   Problem List Items Addressed This Visit        Respiratory    Asthma    Relevant Medications    loratadine (CLARITIN) 10 MG tablet    fluticasone (FLONASE) 50 MCG/ACT nasal spray    albuterol (VENTOLIN HFA) 108 (90 Base) MCG/ACT inhaler    budesonide (PULMICORT FLEXHALER) 90 MCG/ACT inhaler    albuterol (PROVENTIL) (2.5 MG/3ML) 0.083% neb solution    predniSONE (DELTASONE) 20 MG tablet      Other Visit Diagnoses     Mild intermittent asthma with exacerbation    -  Primary    Relevant Medications    loratadine (CLARITIN) 10 MG tablet    fluticasone (FLONASE) 50 MCG/ACT nasal spray    albuterol (VENTOLIN HFA) 108 (90 Base) MCG/ACT inhaler    budesonide (PULMICORT FLEXHALER) 90 MCG/ACT inhaler    albuterol (PROVENTIL) (2.5 MG/3ML) 0.083% neb solution    predniSONE (DELTASONE) 20 MG tablet    Seasonal allergic rhinitis due to pollen        Relevant Medications    loratadine (CLARITIN) 10 MG tablet    fluticasone (FLONASE) 50 MCG/ACT nasal spray    albuterol (VENTOLIN HFA) 108 (90 Base) MCG/ACT inhaler    budesonide (PULMICORT FLEXHALER) 90 MCG/ACT inhaler    albuterol (PROVENTIL) (2.5 MG/3ML) 0.083% neb solution    Elevated blood pressure reading without diagnosis of hypertension             Presented today for asthma exacerbation. Restarted on budesonide inhaler daily. Prescription sent for prednisone taper for acute flare. Advised to continue using rescue inhaler for current flare as needed. Discussed return precautions if symptoms do not resolve. Refills of rescue inhaler, loratadine, Flonase and albuterol neb. Discussed and sent home with asthma action plan for any future flares. Advised to follow up in 1 months time for complete physical and re-evaluation.     Blood pressure elevated today. Decreased to 144/105. Advised to continue to monitor at home. Discussed decrease in salt intake and regular physical activity. Sent along DASH diet plan.     Blood pressure is elevated today. Encouraged to  monitor blood pressure a couple times a week over the next few weeks. Return in 3-4 weeks if blood pressure is persistently elevated for a recheck appointment. Work on diet and exercise to decrease blood pressure. Weight loss is helpful.  Decrease salt intake.      -- Learn about DASH Diet ([email or web address] SITE/DASHDiet - redirects to the [facility]) for dietary ways to reduce blood pressure      Tentative follow up scheduled for 05/19/22.    VANESSA Hawkins  I was present with the student who participated in the service and in the documentation of this note.  I have verified the history and personally performed a physical exam and medical decision making, and have verified the content of the note, which accurately reflects my assessment of the patient and the plan of care.    Amy Rangel PA-C  [facility] AND [facility]    Glenn Washington is a 27 year old who presents for the following health issues     History of Present Illness     Asthma:  He presents for follow up of asthma.  He has some cough, some wheezing, and some shortness of breath. He is using a relief medication 2-3 times per day. He does not have a controller medication. Patient is aware of the following triggers: animal dander and exercise or sports. The patient has not had a visit to the Emergency Room, Urgent Care or Hospital due to asthma since the last clinic visit.     He eats 0-1 servings of fruits and vegetables daily.He consumes 1 sweetened beverage(s) daily.He exercises with enough effort to increase his heart rate 30 to 60 minutes per day.  He exercises with enough effort to increase his heart rate 5 days per week. He is missing 7 dose(s) of medications per week.       Asthma Follow-Up    Was ACT completed today?  Yes    ACT Total Scores 4/21/2022   ACT TOTAL SCORE (Goal Greater than or Equal to 20) 11   In the past 12 months, how many times did you visit the emergency room for your asthma without being admitted to the hospital? 0   In the  "past 12 months, how many times were you hospitalized overnight because of your asthma? 0       How many days per week do you miss taking your asthma controller medication?  7    Please describe any recent triggers for your asthma: pollens, animal dander, mold and exercise or sports    Have you had any Emergency Room Visits, Urgent Care Visits, or Hospital Admissions since your last office visit?  No    Presenting today for concerns of asthma flare. Symptoms of SOB and cough began 2 weeks ago. Has not been able to get under control. Not sure the cause of flare, but could be due to his allergies. Has only been taking his albuterol neb and rescue inhaler up to 4 times daily for the past week with minimal relief. Takes Claritin and Flonase for his allergies, but does not feel they are flaring up right now.  Does not know the last time he took his budesonide. No duoneb at home from last flare.     Blood pressure is elevated today. Attributing to taking his albuterol frequently and gets anxiety when coming into clinic. Does not check regularly at home. Working on decreasing salt intake with diet. Moderate exercise weekly.     Denies any fever, chills, sweats, URI symptoms, chest pain.       Review of Systems   Constitutional: Negative for chills and fever.   HENT: Negative for congestion, rhinorrhea and sore throat.    Respiratory: Positive for cough, shortness of breath and wheezing. Negative for stridor.    Cardiovascular: Negative for chest pain and palpitations.   Gastrointestinal: Negative for constipation, diarrhea, nausea and vomiting.   Musculoskeletal: Negative.    Neurological: Negative for dizziness, light-headedness and headaches.   Psychiatric/Behavioral: Negative.             Objective    BP (!) 144/105 (BP Location: Left arm, Patient Position: Sitting, Cuff Size: Adult Regular)   Pulse 106   Temp 98.4  F (36.9  C) (Tympanic)   Resp 22   Ht 1.727 m (5' 8\")   Wt 67.9 kg (149 lb 9.6 oz)   SpO2 95%   BMI " 22.75 kg/m    Body mass index is 22.75 kg/m .  Physical Exam   General: Pleasant, in no apparent distress.  Eyes: Sclera are white and conjunctiva are clear bilaterally. Lacrimal apparatus free of erythema, edema, and discharge bilaterally.  Ears: External ears without erythema or edema. Tympanic membranes are pearly white and without erythema, scarring or perforations bilaterally. External auditory canals are free of foreign bodies, erythema, ulcers, and masses.  Oropharynx: Oral mucosa is pink and without ulcers, nodules, and white patches. Tongue is symmetrical, pink, and without masses or lesions. Pharynx is pink, symmetrical, and without lesions. Uvula is midline. Tonsils are pink, symmetrical, and without edema, ulcers, or exudates, and 1+ bilaterally.  Neck: No cervical lymphadenopathy on inspection and palpation.  Cardiovascular: Regular rate and rhythm with S1 equal to S2. No murmurs, friction rubs, or gallops.   Respiratory: Lungs have wheezing throughout all fields. No crackles or rhonchi.  Skin: No jaundice, pallor, rashes, or lesions.  Psych: Appropriate mood and affect.

## 2022-04-21 NOTE — PATIENT INSTRUCTIONS
Blood pressure is elevated today. Encouraged to monitor blood pressure a couple times a week over the next few weeks. Return in 3-4 weeks if blood pressure is persistently elevated for a recheck appointment. Work on diet and exercise to decrease blood pressure. Weight loss is helpful.  Decrease salt intake.      -- Learn about DASH Diet ([email or web address]/DASHDiet - redirects to the [facility]) for dietary ways to reduce blood pressure      Encouraged to set up a full physical and recheck in 1 month.

## 2022-04-21 NOTE — NURSING NOTE
Pt presents to clinic today for an asthma flare up that started about 2 weeks ago      FOOD SECURITY SCREENING QUESTIONS:    The next two questions are to help us understand your food security.  If you are feeling you need any assistance in this area, we have resources available to support you today.    Hunger Vital Signs:  Within the past 12 months we worried whether our food would run out before we got money to buy more. Never  Within the past 12 months the food we bought just didn't last and we didn't have money to get more. Never           Medication Reconciliation: complete  Brandi Medeiros LPN,LPN on 4/21/2022 at 2:32 PM

## 2022-04-21 NOTE — LETTER
My Asthma Action Plan    Name: Felix Gutierrez   YOB: 1995  Date: 4/21/2022   My doctor: Amy Rangel PA-C   My clinic: [facility] AND \[facility]\""        My Rescue Medicine:   Albuterol inhaler (Proair/Ventolin/Proventil HFA)  2-4 puffs EVERY 4 HOURS as needed. Use a spacer if recommended by your provider.   My Asthma Severity:   Mild Persistent  Know your asthma triggers: upper respiratory infections, pollens, animal dander, insects/rodents, mold and exercise or sports  pollens  animal dander  mold  exercise or sports          GREEN ZONE   Good Control    I feel good    No cough or wheeze    Can work, sleep and play without asthma symptoms       Take your asthma control medicine every day.     1. If exercise triggers your asthma, take your rescue medication    15 minutes before exercise or sports, and    During exercise if you have asthma symptoms  2. Spacer to use with inhaler: If you have a spacer, make sure to use it with your inhaler             YELLOW ZONE Getting Worse  I have ANY of these:    I do not feel good    Cough or wheeze    Chest feels tight    Wake up at night   1. Keep taking your Green Zone medications  2. Start taking your rescue medicine:    every 20 minutes for up to 1 hour. Then every 4 hours for 24-48 hours.  3. If you stay in the Yellow Zone for more than 12-24 hours, contact your doctor.  4. If you do not return to the Green Zone in 12-24 hours or you get worse, start taking your oral steroid medicine if prescribed by your provider.           RED ZONE Medical Alert - Get Help  I have ANY of these:    I feel awful    Medicine is not helping    Breathing getting harder    Trouble walking or talking    Nose opens wide to breathe       1. Take your rescue medicine NOW  2. If your provider has prescribed an oral steroid medicine, start taking it NOW  3. Call your doctor NOW  4. If you are still in the Red Zone after 20 minutes and you have not reached your  doctor:    Take your rescue medicine again and    Call 911 or go to the emergency room right away    See your regular doctor within 2 weeks of an Emergency Room or Urgent Care visit for follow-up treatment.          Annual Reminders:  Meet with Asthma Educator,  Flu Shot in the Fall, consider Pneumonia Vaccination for patients with asthma (aged 19 and older).    Pharmacy: [facility] [address] [facility] [address]    Electronically signed by Amy Rangel PA-C   Date: 04/21/22                    Asthma Triggers  How To Control Things That Make Your Asthma Worse    Triggers are things that make your asthma worse.  Look at the list below to help you find your triggers and   what you can do about them. You can help prevent asthma flare-ups by staying away from your triggers.      Trigger                                                          What you can do   Cigarette Smoke  Tobacco smoke can make asthma worse. Do not allow smoking in your home, car or around you.  Be sure no one smokes at a child s day care or school.  If you smoke, ask your health care provider for ways to help you quit.  Ask family members to quit too.  Ask your health care provider for a referral to Quit Plan to help you quit smoking, or call 2-971-386-PLAN.     Colds, Flu, Bronchitis  These are common triggers of asthma. Wash your hands often.  Don t touch your eyes, nose or mouth.  Get a flu shot every year.     Dust Mites  These are tiny bugs that live in cloth or carpet. They are too small to see. Wash sheets and blankets in hot water every week.   Encase pillows and mattress in dust mite proof covers.  Avoid having carpet if you can. If you have carpet, vacuum weekly.   Use a dust mask and HEPA vacuum.   Pollen and Outdoor Mold  Some people are allergic to trees, grass, or weed pollen, or molds. Try to keep your windows closed.  Limit time out doors when pollen count is high.   Ask you health care provider about  taking medicine during allergy season.     Animal Dander  Some people are allergic to skin flakes, urine or saliva from pets with fur or feathers. Keep pets with fur or feathers out of your home.    If you can t keep the pet outdoors, then keep the pet out of your bedroom.  Keep the bedroom door closed.  Keep pets off cloth furniture and away from stuffed toys.     Mice, Rats, and Cockroaches  Some people are allergic to the waste from these pests.   Cover food and garbage.  Clean up spills and food crumbs.  Store grease in the refrigerator.   Keep food out of the bedroom.   Indoor Mold  This can be a trigger if your home has high moisture. Fix leaking faucets, pipes, or other sources of water.   Clean moldy surfaces.  Dehumidify basement if it is damp and smelly.   Smoke, Strong Odors, and Sprays  These can reduce air quality. Stay away from strong odors and sprays, such as perfume, powder, hair spray, paints, smoke incense, paint, cleaning products, candles and new carpet.   Exercise or Sports  Some people with asthma have this trigger. Be active!  Ask your doctor about taking medicine before sports or exercise to prevent symptoms.    Warm up for 5-10 minutes before and after sports or exercise.     Other Triggers of Asthma  Cold air:  Cover your nose and mouth with a scarf.  Sometimes laughing or crying can be a trigger.  Some medicines and food can trigger asthma.

## 2022-06-08 ENCOUNTER — OFFICE VISIT (OUTPATIENT)
Dept: FAMILY MEDICINE | Facility: OTHER | Age: 27
End: 2022-06-08
Attending: PHYSICIAN ASSISTANT
Payer: COMMERCIAL

## 2022-06-08 VITALS
WEIGHT: 156.2 LBS | TEMPERATURE: 99.1 F | DIASTOLIC BLOOD PRESSURE: 102 MMHG | RESPIRATION RATE: 18 BRPM | BODY MASS INDEX: 23.75 KG/M2 | OXYGEN SATURATION: 98 % | HEART RATE: 115 BPM | SYSTOLIC BLOOD PRESSURE: 154 MMHG

## 2022-06-08 DIAGNOSIS — Z23 NEED FOR DIPHTHERIA-TETANUS-PERTUSSIS (TDAP) VACCINE: ICD-10-CM

## 2022-06-08 DIAGNOSIS — R03.0 ELEVATED BLOOD PRESSURE READING WITHOUT DIAGNOSIS OF HYPERTENSION: ICD-10-CM

## 2022-06-08 DIAGNOSIS — J45.40 MODERATE PERSISTENT ASTHMA WITHOUT COMPLICATION: Primary | ICD-10-CM

## 2022-06-08 PROCEDURE — 99213 OFFICE O/P EST LOW 20 MIN: CPT | Performed by: PHYSICIAN ASSISTANT

## 2022-06-08 PROCEDURE — G0463 HOSPITAL OUTPT CLINIC VISIT: HCPCS

## 2022-06-08 PROCEDURE — G0463 HOSPITAL OUTPT CLINIC VISIT: HCPCS | Mod: 25

## 2022-06-08 PROCEDURE — 90471 IMMUNIZATION ADMIN: CPT

## 2022-06-08 PROCEDURE — 90715 TDAP VACCINE 7 YRS/> IM: CPT

## 2022-06-08 ASSESSMENT — ASTHMA QUESTIONNAIRES: ACT_TOTALSCORE: 23

## 2022-06-08 ASSESSMENT — PAIN SCALES - GENERAL: PAINLEVEL: NO PAIN (0)

## 2022-06-08 NOTE — PATIENT INSTRUCTIONS
Blood pressure is elevated today. Encouraged to monitor blood pressure a couple times a week over the next few weeks. Return in 2-3 months if blood pressure is persistently elevated for a recheck appointment. Work on diet and exercise to decrease blood pressure.  Decrease salt intake.      -- Learn about DASH Diet ([email or web address]/DASHDiet - redirects to the [facility]) for dietary ways to reduce blood pressure

## 2022-06-08 NOTE — PROGRESS NOTES
Assessment & Plan   Problem List Items Addressed This Visit        Respiratory    Asthma - Primary      Other Visit Diagnoses     Need for diphtheria-tetanus-pertussis (Tdap) vaccine        Relevant Orders    [facility] IMM - TDAP (ADACEL, BOOSTRIX) (Completed)    Elevated blood pressure reading without diagnosis of hypertension             Blood pressure is elevated today.  Discussed holding anticoagulation medication today however patient declined at this time.  He would like to work on diet and exercise to help reduce his blood pressure prior to starting on a medication.  Encouraged to monitor blood pressure a couple times a week over the next few weeks. Return in 2-3 months if blood pressure is persistently elevated for a recheck appointment. Work on diet and exercise to decrease blood pressure.  Decrease salt intake.      -- Learn about DASH Diet ([email or web address]/DASHDiet - redirects to the [employer]) for dietary ways to reduce blood pressure     Asthma: Patient is stable.  No acute concerns at this time.    Patient was given a Tdap vaccine.       See Patient Instructions    Return in 2 months (on 8/8/2022) for Blood Pressure Recheck.    Amy Rangel PA-C  [facility] AND [facility]    Glenn Washington is a 27 year old who presents for the following health issues     History of Present Illness     Asthma:  He presents for follow up of asthma.  He has no cough, no wheezing, and no shortness of breath. He is using a relief medication a few times a month. He typically misses taking his controller medication 1 time(s) per week.Patient is aware of the following triggers: same as previous visit. The patient has not had a visit to the Emergency Room, Urgent Care or Hospital due to asthma since the last clinic visit.     He eats 2-3 servings of fruits and vegetables daily.He consumes 1 sweetened beverage(s) daily.He exercises with enough effort to increase his heart rate 10 to 19 minutes per day.  He exercises with enough  effort to increase his heart rate 3 or less days per week. He is missing 1 dose(s) of medications per week.  He is not taking prescribed medications regularly due to remembering to take.       Asthma Follow-Up    Was ACT completed today?    Yes    ACT Total Scores 6/8/2022   ACT TOTAL SCORE (Goal Greater than or Equal to 20) 23   In the past 12 months, how many times did you visit the emergency room for your asthma without being admitted to the hospital? 0   In the past 12 months, how many times were you hospitalized overnight because of your asthma? 0          How many days per week do you miss taking your asthma controller medication?  2    Please describe any recent triggers for your asthma: pollens, animal dander and mold    Have you had any Emergency Room Visits, Urgent Care Visits, or Hospital Admissions since your last office visit?  No    Patient states that his asthma is stable.  No acute concerns at this time.  Breathing has been stable.  No cough or cold symptoms.    Blood pressure has been elevated at home.  Systolic ranges from 144-150.  Diastolic ranges from 84-90.  Understands that he needs to work on improving his diet and exercise.  Tends to snack and eat a lot of salty foods.  Declines starting on a blood pressure medication at this time.  Would like to work on diet and exercise in order to improve his blood pressure.  Starting on a blood pressure medication.  No chest pain, palpitations, problems breathing, GI or urinary symptoms.    Review of Systems   Constitutional, HEENT, cardiovascular, pulmonary, gi and gu systems are negative, except as otherwise noted.      Objective    BP (!) 154/102 (BP Location: Right arm, Patient Position: Sitting, Cuff Size: Adult Regular)   Pulse 115   Temp 99.1  F (37.3  C) (Tympanic)   Resp 18   Wt 70.9 kg (156 lb 3.2 oz)   SpO2 98%   BMI 23.75 kg/m    Body mass index is 23.75 kg/m .  Physical Exam  Vitals and nursing note reviewed.   Constitutional:        Appearance: Normal appearance.   Cardiovascular:      Rate and Rhythm: Normal rate and regular rhythm.      Heart sounds: Normal heart sounds.   Pulmonary:      Effort: Pulmonary effort is normal.      Breath sounds: Normal breath sounds.   Musculoskeletal:         General: Normal range of motion.   Skin:     General: Skin is warm and dry.   Neurological:      General: No focal deficit present.      Mental Status: He is alert and oriented to person, place, and time.   Psychiatric:         Mood and Affect: Mood normal.         Behavior: Behavior normal.

## 2022-06-08 NOTE — NURSING NOTE
Pt presents to clinic today for a follow up for asthma flare up. Patient states he is now well controlled. ACT completed.       FOOD SECURITY SCREENING QUESTIONS:    The next two questions are to help us understand your food security.  If you are feeling you need any assistance in this area, we have resources available to support you today.    Hunger Vital Signs:  Within the past 12 months we worried whether our food would run out before we got money to buy more. Never  Within the past 12 months the food we bought just didn't last and we didn't have money to get more. Never            Medication Reconciliation: complete  Brandi Medeiros LPN,LPN on 6/8/2022 at 2:45 PM

## 2022-09-17 ENCOUNTER — HEALTH MAINTENANCE LETTER (OUTPATIENT)
Age: 27
End: 2022-09-17

## 2023-01-28 ENCOUNTER — HEALTH MAINTENANCE LETTER (OUTPATIENT)
Age: 28
End: 2023-01-28

## 2024-02-25 ENCOUNTER — HEALTH MAINTENANCE LETTER (OUTPATIENT)
Age: 29
End: 2024-02-25

## 2025-02-26 ENCOUNTER — OFFICE VISIT (OUTPATIENT)
Dept: FAMILY MEDICINE | Facility: OTHER | Age: 30
End: 2025-02-26
Attending: FAMILY MEDICINE
Payer: COMMERCIAL

## 2025-02-26 VITALS
HEART RATE: 100 BPM | OXYGEN SATURATION: 98 % | WEIGHT: 182 LBS | DIASTOLIC BLOOD PRESSURE: 86 MMHG | BODY MASS INDEX: 27.58 KG/M2 | RESPIRATION RATE: 18 BRPM | SYSTOLIC BLOOD PRESSURE: 122 MMHG | HEIGHT: 68 IN | TEMPERATURE: 98.6 F

## 2025-02-26 DIAGNOSIS — R79.89 ELEVATED LFTS: ICD-10-CM

## 2025-02-26 DIAGNOSIS — E78.2 MIXED HYPERLIPIDEMIA: ICD-10-CM

## 2025-02-26 DIAGNOSIS — R03.0 ELEVATED BLOOD PRESSURE READING WITHOUT DIAGNOSIS OF HYPERTENSION: ICD-10-CM

## 2025-02-26 DIAGNOSIS — Z11.4 ENCOUNTER FOR SCREENING FOR HIV: ICD-10-CM

## 2025-02-26 DIAGNOSIS — J30.89 ENVIRONMENTAL AND SEASONAL ALLERGIES: ICD-10-CM

## 2025-02-26 DIAGNOSIS — J45.40 MODERATE PERSISTENT ASTHMA WITHOUT COMPLICATION: ICD-10-CM

## 2025-02-26 DIAGNOSIS — Z11.59 NEED FOR HEPATITIS C SCREENING TEST: ICD-10-CM

## 2025-02-26 DIAGNOSIS — Z13.220 LIPID SCREENING: ICD-10-CM

## 2025-02-26 DIAGNOSIS — Z76.89 ENCOUNTER TO ESTABLISH CARE: Primary | ICD-10-CM

## 2025-02-26 DIAGNOSIS — Z13.1 SCREENING FOR DIABETES MELLITUS: ICD-10-CM

## 2025-02-26 PROBLEM — L70.9 ACNE, MILD: Status: RESOLVED | Noted: 2018-01-29 | Resolved: 2025-02-26

## 2025-02-26 LAB
ALBUMIN SERPL BCG-MCNC: 4.7 G/DL (ref 3.5–5.2)
ALP SERPL-CCNC: 52 U/L (ref 40–150)
ALT SERPL W P-5'-P-CCNC: 82 U/L (ref 0–70)
ANION GAP SERPL CALCULATED.3IONS-SCNC: 11 MMOL/L (ref 7–15)
AST SERPL W P-5'-P-CCNC: 44 U/L (ref 0–45)
BASOPHILS # BLD AUTO: 0.1 10E3/UL (ref 0–0.2)
BASOPHILS NFR BLD AUTO: 1 %
BILIRUB SERPL-MCNC: 0.5 MG/DL
BUN SERPL-MCNC: 9.7 MG/DL (ref 6–20)
CALCIUM SERPL-MCNC: 10 MG/DL (ref 8.8–10.4)
CHLORIDE SERPL-SCNC: 103 MMOL/L (ref 98–107)
CHOLEST SERPL-MCNC: 201 MG/DL
CREAT SERPL-MCNC: 0.94 MG/DL (ref 0.67–1.17)
EGFRCR SERPLBLD CKD-EPI 2021: >90 ML/MIN/1.73M2
EOSINOPHIL # BLD AUTO: 0.4 10E3/UL (ref 0–0.7)
EOSINOPHIL NFR BLD AUTO: 6 %
ERYTHROCYTE [DISTWIDTH] IN BLOOD BY AUTOMATED COUNT: 12.7 % (ref 10–15)
EST. AVERAGE GLUCOSE BLD GHB EST-MCNC: 100 MG/DL
FASTING STATUS PATIENT QL REPORTED: NO
FASTING STATUS PATIENT QL REPORTED: NO
GLUCOSE SERPL-MCNC: 103 MG/DL (ref 70–99)
HBA1C MFR BLD: 5.1 %
HCO3 SERPL-SCNC: 25 MMOL/L (ref 22–29)
HCT VFR BLD AUTO: 46.2 % (ref 40–53)
HDLC SERPL-MCNC: 37 MG/DL
HGB BLD-MCNC: 16.2 G/DL (ref 13.3–17.7)
IMM GRANULOCYTES # BLD: 0 10E3/UL
IMM GRANULOCYTES NFR BLD: 0 %
LDLC SERPL CALC-MCNC: 128 MG/DL
LYMPHOCYTES # BLD AUTO: 1.8 10E3/UL (ref 0.8–5.3)
LYMPHOCYTES NFR BLD AUTO: 30 %
MCH RBC QN AUTO: 30.3 PG (ref 26.5–33)
MCHC RBC AUTO-ENTMCNC: 35.1 G/DL (ref 31.5–36.5)
MCV RBC AUTO: 86 FL (ref 78–100)
MONOCYTES # BLD AUTO: 0.4 10E3/UL (ref 0–1.3)
MONOCYTES NFR BLD AUTO: 7 %
NEUTROPHILS # BLD AUTO: 3.3 10E3/UL (ref 1.6–8.3)
NEUTROPHILS NFR BLD AUTO: 56 %
NONHDLC SERPL-MCNC: 164 MG/DL
NRBC # BLD AUTO: 0 10E3/UL
NRBC BLD AUTO-RTO: 0 /100
PLATELET # BLD AUTO: 252 10E3/UL (ref 150–450)
POTASSIUM SERPL-SCNC: 4.3 MMOL/L (ref 3.4–5.3)
PROT SERPL-MCNC: 7.5 G/DL (ref 6.4–8.3)
RBC # BLD AUTO: 5.35 10E6/UL (ref 4.4–5.9)
SODIUM SERPL-SCNC: 139 MMOL/L (ref 135–145)
TRIGL SERPL-MCNC: 178 MG/DL
WBC # BLD AUTO: 5.9 10E3/UL (ref 4–11)

## 2025-02-26 PROCEDURE — 83036 HEMOGLOBIN GLYCOSYLATED A1C: CPT | Mod: ZL | Performed by: FAMILY MEDICINE

## 2025-02-26 PROCEDURE — 82465 ASSAY BLD/SERUM CHOLESTEROL: CPT | Mod: ZL | Performed by: FAMILY MEDICINE

## 2025-02-26 PROCEDURE — 85025 COMPLETE CBC W/AUTO DIFF WBC: CPT | Mod: ZL | Performed by: FAMILY MEDICINE

## 2025-02-26 PROCEDURE — 80053 COMPREHEN METABOLIC PANEL: CPT | Mod: ZL | Performed by: FAMILY MEDICINE

## 2025-02-26 PROCEDURE — 84132 ASSAY OF SERUM POTASSIUM: CPT | Mod: ZL | Performed by: FAMILY MEDICINE

## 2025-02-26 PROCEDURE — 36415 COLL VENOUS BLD VENIPUNCTURE: CPT | Mod: ZL | Performed by: FAMILY MEDICINE

## 2025-02-26 PROCEDURE — 87389 HIV-1 AG W/HIV-1&-2 AB AG IA: CPT | Mod: ZL | Performed by: FAMILY MEDICINE

## 2025-02-26 PROCEDURE — 86803 HEPATITIS C AB TEST: CPT | Mod: ZL | Performed by: FAMILY MEDICINE

## 2025-02-26 RX ORDER — MONTELUKAST SODIUM 10 MG/1
10 TABLET ORAL AT BEDTIME
Qty: 90 TABLET | Refills: 3 | Status: SHIPPED | OUTPATIENT
Start: 2025-02-26

## 2025-02-26 RX ORDER — FLUTICASONE PROPIONATE AND SALMETEROL 100; 50 UG/1; UG/1
1 POWDER RESPIRATORY (INHALATION) 2 TIMES DAILY
Qty: 60 EACH | Refills: 12 | Status: SHIPPED | OUTPATIENT
Start: 2025-02-26

## 2025-02-26 RX ORDER — FLUTICASONE PROPIONATE 50 MCG
2 SPRAY, SUSPENSION (ML) NASAL DAILY
Qty: 16 G | Refills: 11 | Status: SHIPPED | OUTPATIENT
Start: 2025-02-26

## 2025-02-26 ASSESSMENT — PAIN SCALES - GENERAL: PAINLEVEL_OUTOF10: NO PAIN (0)

## 2025-02-26 NOTE — PATIENT INSTRUCTIONS
Continue Advair twice daily    Start Singulair daily in the evening, continue Zyrtec daily as well    Continue Flonase as needed, would recommend using it daily during seasons that your symptoms are frequent but okay to use it only intermittently as needed the rest of the time    Recommend monitoring your blood pressure at home periodically at least in the short-term to get a trend of your blood pressures over time.  Your blood pressure was pretty good today.  I suspect it was elevated before because your asthma was poorly controlled.  Goal would be for your blood pressure to be less than 130/80 all the time.  Please report back if your home blood pressures are consistently above that.

## 2025-02-26 NOTE — PROGRESS NOTES
Assessment & Plan     (Z76.89) Encounter to establish care  (primary encounter diagnosis)  Comment:     (J45.40) Moderate persistent asthma without complication  (J30.89) Environmental and seasonal allergies  Comment: Continue Advair.  Asthma symptoms have improved markedly since starting this.  Recommend adding Singulair for both asthma and allergy symptoms.  He apparently tolerated this well in the past.  He can continue Zyrtec daily as well.  Continue Flonase as needed, discussed that it is safe to use this on a regular daily basis during seasons that his symptoms are more bothersome.  Plan: fluticasone-salmeterol (ADVAIR) 100-50 MCG/ACT         inhaler, CBC and Differential, montelukast         (SINGULAIR) 10 MG tablet, fluticasone (FLONASE) 50 MCG/ACT nasal spray    (R03.0) Elevated blood pressure reading without diagnosis of hypertension  Comment: Markedly improved today, suspect that uncontrolled asthma was a contributing factor.  Recommend monitoring this at home with goal less than 130/80.  He will report back if he is consistently seeing blood pressure readings above goal and we can consider initiation of medication, though that does not appear necessary today.  Plan: Comprehensive Metabolic Panel    (Z13.1) Screening for diabetes mellitus  Comment:   Plan: Hemoglobin A1c, Comprehensive Metabolic Panel    (Z13.220) Lipid screening  Mixed hyperlipidemia  Comment: Mild mixed hyperlipidemia on lab.  Monitor every 1 to 2 years with lipid panel.  Does not warrant statin therapy at this point.  Plan: Lipid Panel    Elevated LFT  Comment: ALT is mildly elevated.  Suspect that fairly regular alcohol consumption is a contributing factor.  Hepatic steatosis may also be contributory.  Alcohol reduction is encouraged.  Monitor at least annually with lab.    (Z11.59) Need for hepatitis C screening test  Comment:   Plan: Hepatitis C Screen Reflex to HCV RNA Quant and         Genotype    (Z11.4) Encounter for screening  "for HIV  Comment:   Plan: HIV Antigen Antibody Combo       BMI  Estimated body mass index is 27.67 kg/m  as calculated from the following:    Height as of this encounter: 1.727 m (5' 8\").    Weight as of this encounter: 82.6 kg (182 lb).   Weight management plan: Discussed healthy diet and exercise guidelines          Return in about 1 year (around 2/26/2026), or if symptoms worsen or fail to improve, for Physical, Chronic Disease Management.    Glenn Washington is a 29 year old, presenting for the following health issues:  Establish Care (Asthma and BP)        2/26/2025     1:51 PM   Additional Questions   Roomed by Anali HART LPN     History of Present Illness       Reason for visit:  Establish care and follow up on asthma and blood pressure He is missing 2 dose(s) of medications per week.  He is not taking prescribed medications regularly due to remembering to take and other.     Very pleasant 29-year-old male presents to clinic to establish care.  His past medical history was reviewed and updated in his chart.  He is agreeable to screening lab today.  He declines influenza and COVID vaccines.  He did get a Prevnar vaccine in January.    He has moderate persistent asthma that was uncontrolled earlier this winter.  He was seen twice in the clinic.  At the end of January he was started on Advair twice daily and symptom control has improved significantly.  He has very rarely needed to use his albuterol inhaler since then whereas he was using it several times per day prior to starting Advair.  He was previously on a budesonide controller inhaler but it was not covered by insurance and he had been out of that with worsened symptoms subsequently.  He has had asthma since childhood, often triggered by viral illnesses, smoke, pollens and environmental allergens, and pet dander primarily.  He was on Singulair as a child but has not been on it for several years.  He does not recall any difficulty tolerating it in the past.  " "He definitely has chronic environmental allergies and takes Zyrtec year-round.  After discussion today, he would be agreeable to adding Singulair as well.  He uses Flonase intermittently as needed, primarily during peak allergy seasons in the spring and fall.    His blood pressure had been elevated when he was in the clinic when his asthma was poorly controlled.  His blood pressure is markedly improved today.  He has not been checking that at home but would be agreeable to at least periodically checking that.  He denies any other concerns or complaints.  He denies concern for STI and declines screening.              Review of Systems  Pertinent positives and negatives as per HPI, otherwise negative.        Objective    /86   Pulse 100   Temp 98.6  F (37  C) (Tympanic)   Resp 18   Ht 1.727 m (5' 8\")   Wt 82.6 kg (182 lb)   SpO2 98%   BMI 27.67 kg/m    Body mass index is 27.67 kg/m .  Physical Exam     General: alert and oriented x 3, no acute distress, pleasant, conversant  Head: normocephalic, atraumatic  Ears: tympanic membranes pearly gray, canals clear  Eyes: pupils equal, round, and reactive to light, conjunctiva pink, sclera white  Oropharynx: pink without tonsillar enlargement nor exudate  Neck: supple without lymphadenopathy, thyroid not enlarged  Lungs: clear to auscultation, no wheezes, rhonchi or rales, no increased work of breathing  Heart: regular rate and rhythm, no murmurs auscultated  Abdomen: soft, nontender, nondistended, normoactive bowel sounds, no palpable masses or organomegaly  Skin: no abnormal lesions or rash  Musculoskeletal/Extremities: good ROM throughout, no peripheral edema  Neuro: no gross focal deficits    Labs pending  Influenza declined  PCV done  Tdap/Td due 2032  COVID declined            I spent a total of 36 minutes on the patient's care today including preparing for the visit, the visit, documentation, and follow-up care. This does not include any procedure " time.    The longitudinal plan of care for the diagnosis(es)/condition(s) as documented were addressed during this visit. Due to the added complexity in care, I will continue to support Felix in the subsequent management and with ongoing continuity of care.    Signed Electronically by: Christin Palma MD

## 2025-02-26 NOTE — LETTER
My Asthma Action Plan    Name: Felix Gutierrez   YOB: 1995  Date: 2/26/2025   My doctor: Christin Palma MD   My clinic: [facility]        My Control Medicine: Fluticasone propionate + salmeterol (Advair Diskus or Wixela Inhub) -  100/50 mcg twice daily  My Rescue Medicine: Albuterol (Proair/Ventolin/Proventil HFA) 2-4 puffs EVERY 4 HOURS as needed. Use a spacer if recommended by your provider.   My Asthma Severity:   Moderate Persistent  Know your asthma triggers: smoke, upper respiratory infections, pollens, and animal dander  cold air            GREEN ZONE   Good Control  I feel good  No cough or wheeze  Can work, sleep and play without asthma symptoms       Take your asthma control medicine every day.     If exercise triggers your asthma, take your rescue medication  15 minutes before exercise or sports, and  During exercise if you have asthma symptoms  Spacer to use with inhaler: If you have a spacer, make sure to use it with your inhaler             YELLOW ZONE Getting Worse  I have ANY of these:  I do not feel good  Cough or wheeze  Chest feels tight  Wake up at night   Keep taking your Green Zone medications  Start taking your rescue medicine:  every 20 minutes for up to 1 hour. Then every 4 hours for 24-48 hours.  If you stay in the Yellow Zone for more than 12-24 hours, contact your doctor.  If you do not return to the Green Zone in 12-24 hours or you get worse, start taking your oral steroid medicine if prescribed by your provider.           RED ZONE Medical Alert - Get Help  I have ANY of these:  I feel awful  Medicine is not helping  Breathing getting harder  Trouble walking or talking  Nose opens wide to breathe       Take your rescue medicine NOW  If your provider has prescribed an oral steroid medicine, start taking it NOW  Call your doctor NOW  If you are still in the Red Zone after 20 minutes and you have not reached your doctor:  Take your rescue medicine  again and  Call 911 or go to the emergency room right away    See your regular doctor within 2 weeks of an Emergency Room or Urgent Care visit for follow-up treatment.          Annual Reminders:  Meet with Asthma Educator,  Flu Shot in the Fall, consider Pneumonia Vaccination for patients with asthma (aged 19 and older).    Pharmacy: [phone] ([place] - [place] [id number]    Electronically signed by Christin Palma MD   Date: 02/26/25                      Asthma Triggers  How To Control Things That Make Your Asthma Worse    Triggers are things that make your asthma worse.  Look at the list below to help you find your triggers and what you can do about them.  You can help prevent asthma flare-ups by staying away from your triggers.      Trigger                                                          What you can do   Cigarette Smoke  Tobacco smoke can make asthma worse. Do not allow smoking in your home, car or around you.  Be sure no one smokes at a child s day care or school.  If you smoke, ask your health care provider for ways to help you quit.  Ask family members to quit too.  Ask your health care provider for a referral to Quit Plan to help you quit smoking, or call 2-646-639-PLAN.     Colds, Flu, Bronchitis  These are common triggers of asthma. Wash your hands often.  Don t touch your eyes, nose or mouth.  Get a flu shot every year.     Dust Mites  These are tiny bugs that live in cloth or carpet. They are too small to see. Wash sheets and blankets in hot water every week.   Encase pillows and mattress in dust mite proof covers.  Avoid having carpet if you can. If you have carpet, vacuum weekly.   Use a dust mask and HEPA vacuum.   Pollen and Outdoor Mold  Some people are allergic to trees, grass, or weed pollen, or molds. Try to keep your windows closed.  Limit time out doors when pollen count is high.   Ask you health care provider about taking medicine during allergy  season.     Animal Dander  Some people are allergic to skin flakes, urine or saliva from pets with fur or feathers. Keep pets with fur or feathers out of your home.    If you can t keep the pet outdoors, then keep the pet out of your bedroom.  Keep the bedroom door closed.  Keep pets off cloth furniture and away from stuffed toys.     Mice, Rats, and Cockroaches   Some people are allergic to the waste from these pests.   Cover food and garbage.  Clean up spills and food crumbs.  Store grease in the refrigerator.   Keep food out of the bedroom.   Indoor Mold  This can be a trigger if your home has high moisture. Fix leaking faucets, pipes, or other sources of water.   Clean moldy surfaces.  Dehumidify basement if it is damp and smelly.   Smoke, Strong Odors, and Sprays  These can reduce air quality. Stay away from strong odors and sprays, such as perfume, powder, hair spray, paints, smoke incense, paint, cleaning products, candles and new carpet.   Exercise or Sports  Some people with asthma have this trigger. Be active!  Ask your doctor about taking medicine before sports or exercise to prevent symptoms.    Warm up for 5-10 minutes before and after sports or exercise.     Other Triggers of Asthma  Cold air:  Cover your nose and mouth with a scarf.  Sometimes laughing or crying can be a trigger.  Some medicines and food can trigger asthma.

## 2025-02-26 NOTE — NURSING NOTE
"Chief Complaint   Patient presents with    Establish Care     Asthma and BP       Initial /86   Pulse 100   Temp 98.6  F (37  C) (Tympanic)   Resp 18   Ht 1.727 m (5' 8\")   Wt 82.6 kg (182 lb)   SpO2 98%   BMI 27.67 kg/m   Estimated body mass index is 27.67 kg/m  as calculated from the following:    Height as of this encounter: 1.727 m (5' 8\").    Weight as of this encounter: 82.6 kg (182 lb).  Medication Review: complete    The next two questions are to help us understand your food security.  If you are feeling you need any assistance in this area, we have resources available to support you today.           No data to display                  Health Care Directive:  Patient does not have a Health Care Directive: Discussed advance care planning with patient; however, patient declined at this time.    Anali Romero, KANNAN      "

## 2025-02-27 LAB
HCV AB SERPL QL IA: NONREACTIVE
HIV 1+2 AB+HIV1 P24 AG SERPL QL IA: NONREACTIVE

## 2025-03-09 ENCOUNTER — HEALTH MAINTENANCE LETTER (OUTPATIENT)
Age: 30
End: 2025-03-09